# Patient Record
Sex: FEMALE | Race: BLACK OR AFRICAN AMERICAN | NOT HISPANIC OR LATINO | Employment: OTHER | URBAN - METROPOLITAN AREA
[De-identification: names, ages, dates, MRNs, and addresses within clinical notes are randomized per-mention and may not be internally consistent; named-entity substitution may affect disease eponyms.]

---

## 2017-03-11 ENCOUNTER — HOSPITAL ENCOUNTER (EMERGENCY)
Facility: HOSPITAL | Age: 52
Discharge: HOME | End: 2017-03-12
Attending: EMERGENCY MEDICINE
Payer: MEDICARE

## 2017-03-11 DIAGNOSIS — R10.9 ABDOMINAL PAIN, UNSPECIFIED LOCATION: ICD-10-CM

## 2017-03-11 DIAGNOSIS — K59.00 CONSTIPATION, UNSPECIFIED CONSTIPATION TYPE: Primary | ICD-10-CM

## 2017-03-11 LAB
ALBUMIN SERPL BCP-MCNC: 3.6 G/DL
ALP SERPL-CCNC: 56 U/L
ALT SERPL W/O P-5'-P-CCNC: 12 U/L
ANION GAP SERPL CALC-SCNC: 11 MMOL/L
AST SERPL-CCNC: 14 U/L
BASOPHILS # BLD AUTO: 0.04 K/UL
BASOPHILS NFR BLD: 0.4 %
BILIRUB SERPL-MCNC: 0.3 MG/DL
BUN SERPL-MCNC: 18 MG/DL
CALCIUM SERPL-MCNC: 9.4 MG/DL
CHLORIDE SERPL-SCNC: 102 MMOL/L
CO2 SERPL-SCNC: 31 MMOL/L
CREAT SERPL-MCNC: 0.9 MG/DL
DIFFERENTIAL METHOD: ABNORMAL
EOSINOPHIL # BLD AUTO: 0.1 K/UL
EOSINOPHIL NFR BLD: 1.1 %
ERYTHROCYTE [DISTWIDTH] IN BLOOD BY AUTOMATED COUNT: 12.3 %
EST. GFR  (AFRICAN AMERICAN): >60 ML/MIN/1.73 M^2
EST. GFR  (NON AFRICAN AMERICAN): >60 ML/MIN/1.73 M^2
GLUCOSE SERPL-MCNC: 93 MG/DL
HCT VFR BLD AUTO: 34.2 %
HGB BLD-MCNC: 11.3 G/DL
LYMPHOCYTES # BLD AUTO: 3.6 K/UL
LYMPHOCYTES NFR BLD: 39.9 %
MCH RBC QN AUTO: 31 PG
MCHC RBC AUTO-ENTMCNC: 33 %
MCV RBC AUTO: 94 FL
MONOCYTES # BLD AUTO: 0.6 K/UL
MONOCYTES NFR BLD: 6.7 %
NEUTROPHILS # BLD AUTO: 4.6 K/UL
NEUTROPHILS NFR BLD: 51.9 %
PLATELET # BLD AUTO: 300 K/UL
PMV BLD AUTO: 10.9 FL
POTASSIUM SERPL-SCNC: 3.7 MMOL/L
PROT SERPL-MCNC: 7.8 G/DL
RBC # BLD AUTO: 3.64 M/UL
SODIUM SERPL-SCNC: 144 MMOL/L
WBC # BLD AUTO: 8.93 K/UL

## 2017-03-11 PROCEDURE — 85025 COMPLETE CBC W/AUTO DIFF WBC: CPT

## 2017-03-11 PROCEDURE — 99284 EMERGENCY DEPT VISIT MOD MDM: CPT

## 2017-03-11 PROCEDURE — 25500020 PHARM REV CODE 255: Performed by: EMERGENCY MEDICINE

## 2017-03-11 PROCEDURE — 80053 COMPREHEN METABOLIC PANEL: CPT

## 2017-03-11 RX ORDER — DOCUSATE SODIUM 100 MG/1
100 CAPSULE, LIQUID FILLED ORAL 2 TIMES DAILY
Qty: 60 CAPSULE | Refills: 0 | Status: SHIPPED | OUTPATIENT
Start: 2017-03-11 | End: 2017-05-24 | Stop reason: SDUPTHER

## 2017-03-11 RX ORDER — GABAPENTIN 600 MG/1
600 TABLET ORAL 3 TIMES DAILY
COMMUNITY

## 2017-03-11 RX ORDER — HYDROCHLOROTHIAZIDE 25 MG/1
25 TABLET ORAL DAILY
COMMUNITY
End: 2017-05-24 | Stop reason: ALTCHOICE

## 2017-03-11 RX ORDER — BACLOFEN 20 MG/1
20 TABLET ORAL 3 TIMES DAILY
COMMUNITY

## 2017-03-11 RX ORDER — ZOLPIDEM TARTRATE 10 MG/1
10 TABLET ORAL NIGHTLY PRN
COMMUNITY
End: 2017-08-28 | Stop reason: SDUPTHER

## 2017-03-11 RX ORDER — SYRING-NEEDL,DISP,INSUL,0.3 ML 29 G X1/2"
296 SYRINGE, EMPTY DISPOSABLE MISCELLANEOUS ONCE
Qty: 296 ML | Refills: 0 | Status: SHIPPED | OUTPATIENT
Start: 2017-03-11 | End: 2017-03-11

## 2017-03-11 RX ADMIN — IOHEXOL 75 ML: 350 INJECTION, SOLUTION INTRAVENOUS at 11:03

## 2017-03-11 NOTE — ED AVS SNAPSHOT
[facility] -   [id number] [facility] [id number]               Josey Levine   3/11/2017  9:12 PM   ED    Description:  Female : 1965   Department:  [facility]           Your Care was Coordinated By:     Provider Role From To    Arcelia Ordoñez MD Attending Provider [phone] --      Reason for Visit     Abdominal Pain           Diagnoses this Visit        Comments    Constipation, unspecified constipation type    -  Primary       ED Disposition     None           To Do List           Follow-up Information     Follow up with [facility]. Schedule an appointment as soon as possible for a visit in 2 days.    Specialty:  Gastroenterology    Why:  Return to the Emergency Room    Contact information:    [address] [place] [id number]  [phone]       These Medications        Disp Refills Start End    magnesium citrate solution 296 mL 0 3/11/2017 3/11/2017    Take 296 mLs by mouth once. - Oral    docusate sodium (COLACE) 100 MG capsule 60 capsule 0 3/11/2017     Take 1 capsule (100 mg total) by mouth 2 (two) times daily. - Oral      [facility]     [facility] -  Assistance  Registered nurses in the [facility] provide clinical advisement, health education, appointment booking, and other advisory services.  Call for this free service at [phone].             Medications           Message regarding Medications     Verify the changes and/or additions to your medication regime listed below are the same as discussed with your clinician today.  If any of these changes or additions are incorrect, please notify your healthcare provider.        START taking these NEW medications        Refills    magnesium citrate solution 0    Sig: Take 296 mLs by mouth once.    Class: Print    Route: Oral    docusate sodium (COLACE) 100 MG capsule 0    Sig: Take 1 capsule  "(100 mg total) by mouth 2 (two) times daily.    Class: Print    Route: Oral      These medications were administered today        Dose Freq    omnipaque 350 iohexol 75 mL 75 mL IMG once as needed    Sig: Inject 75 mLs into the vein ONCE PRN for contrast.    Class: Normal    Route: Intravenous           Verify that the below list of medications is an accurate representation of the medications you are currently taking.  If none reported, the list may be blank. If incorrect, please contact your healthcare provider. Carry this list with you in case of emergency.           Current Medications     baclofen (LIORESAL) 20 MG tablet Take 20 mg by mouth 3 (three) times daily.    DOCUSATE CALCIUM (STOOL SOFTENER ORAL) Take by mouth.    gabapentin (NEURONTIN) 600 MG tablet Take 600 mg by mouth 3 (three) times daily.    hydrochlorothiazide (HYDRODIURIL) 25 MG tablet Take 25 mg by mouth once daily.    zolpidem (AMBIEN) 10 mg Tab Take 5 mg by mouth nightly as needed.    docusate sodium (COLACE) 100 MG capsule Take 1 capsule (100 mg total) by mouth 2 (two) times daily.    magnesium citrate solution Take 296 mLs by mouth once.           Clinical Reference Information           Your Vitals Were     BP Pulse Temp Resp Height Weight    109/75 (BP Location: Right arm, Patient Position: Sitting) 89 98.2 °F (36.8 °C) (Oral) 16 5' 3" (1.6 m) 64 kg (141 lb)    SpO2 BMI             95% 24.98 kg/m2         Allergies as of 3/11/2017     No Known Allergies      Immunizations Administered on Date of Encounter - 3/11/2017     None      ED Micro, Lab, POCT     Start Ordered       Status Ordering Provider    03/11/17 2127 03/11/17 2126  CBC auto differential  STAT      Final result     03/11/17 2127 03/11/17 2126  Comprehensive metabolic panel  STAT      Final result       ED Imaging Orders     Start Ordered       Status Ordering Provider    03/11/17 2127 03/11/17 2126  CT Abdomen Pelvis With Contrast  1 time imaging      Final result     "   Discharge References/Attachments     CONSTIPATION (ADULT) (ENGLISH)    DIET, EATING A HIGH-FIBER  (ENGLISH)    CONSTIPATION, TREATING (ENGLISH)      [facility] Sign-Up     Activating your [facility] account is as easy as 1-2-3!     1) Visit [email or web address], select Sign Up Now, enter this activation code and your date of birth, then select Next.  [email or web address]  Expires: 4/25/2017 11:38 PM      2) Create a username and password to use when you visit [facility] in the future and select a security question in case you lose your password and select Next.    3) Enter your e-mail address and click Sign Up!    Additional Information  If you have questions, please e-mail [email or web address] or call [phone] to talk to our [facility] staff. Remember, [facility] is NOT to be used for urgent needs. For medical emergencies, dial 911.          [facility] complies with applicable Federal civil rights laws and does not discriminate on the basis of race, color, national origin, age, disability, or sex.        Language Assistance Services     ATTENTION: Language assistance services are available, free of charge. Please call [phone].      ATENCIÓN: Si jeanmariela nikolasgabo, tiene a canales disposición servicios gratuitos de asistencia lingüística. Llame al [phone].     CHÚ Ý: N?u b?n nói Ti?ng Vi?t, có các d?ch v? h? tr? ngôn ng? mi?n phí dành cho b?n. G?i s? [phone].

## 2017-03-12 VITALS
RESPIRATION RATE: 16 BRPM | OXYGEN SATURATION: 97 % | HEART RATE: 79 BPM | WEIGHT: 141 LBS | SYSTOLIC BLOOD PRESSURE: 128 MMHG | DIASTOLIC BLOOD PRESSURE: 78 MMHG | TEMPERATURE: 98 F | HEIGHT: 63 IN | BODY MASS INDEX: 24.98 KG/M2

## 2017-03-12 NOTE — ED PROVIDER NOTES
SCRIBE #1 NOTE: I, Hill Paredes, am scribing for, and in the presence of, Arcelia Ordoñez MD. I have scribed the entire note.      History      Chief Complaint   Patient presents with    Abdominal Pain     left sided abd pain x 3 weeks. denies n/v/d.       Review of patient's allergies indicates:  No Known Allergies     HPI   HPI    3/11/2017, 9:20 PM   History obtained from the patient      History of Present Illness: Josey Levine is a 51 y.o. female patient who presents to the Emergency Department for LLQ abd pain which onset gradually 2-3 weeks ago worsening today. Symptoms are intermittent and moderate in severity. Sx are exacerbated by nothing and relieved by nothing. Pt states she was sent from urgent care for further evaluation. No other sxs reported. Patient denies any fever, N/V/D, chills, constipation, urinary frequency/urgency, difficulty urinating, dysuria, radiating pain, hematochezia and all other sxs at this time. No further complaints or concerns at this time.     Arrival mode: Personal vehicle     PCP: Unknown      Past Medical History:  Past Medical History:   Diagnosis Date    H/O spinal cord injury     Hypertension     Neck fracture        Past Surgical History:  Past Surgical History:   Procedure Laterality Date    CHOLECYSTECTOMY      HYSTERECTOMY      neck fracture      quadrepelgia      spinal cord injury      SPLENECTOMY, TOTAL           Family History:  History reviewed. No pertinent family history.    Social History:  Social History     Social History Main Topics    Smoking status: Current Every Day Smoker     Types: Cigarettes    Smokeless tobacco: Unknown    Alcohol use Yes    Drug use: No    Sexual activity: Unknown       ROS   Review of Systems   Constitutional: Negative for chills and fever.   HENT: Negative for congestion and sore throat.    Respiratory: Negative for chest tightness and shortness of breath.    Cardiovascular: Negative for chest pain.  "  Gastrointestinal: Positive for abdominal pain. Negative for constipation, diarrhea, nausea and vomiting.   Genitourinary: Negative for dysuria.   Musculoskeletal: Negative for back pain and neck pain.   Skin: Negative for rash.   Neurological: Negative for dizziness, weakness, numbness and headaches.   Hematological: Does not bruise/bleed easily.   Psychiatric/Behavioral: Negative for agitation and confusion.   All other systems reviewed and are negative.      Physical Exam    Initial Vitals   BP Pulse Resp Temp SpO2   03/11/17 2103 03/11/17 2103 03/11/17 2103 03/11/17 2103 03/11/17 2103   109/75 89 16 98.2 °F (36.8 °C) 95 %      Physical Exam  Nursing Notes and Vital Signs Reviewed.  Constitutional: Patient is in no acute distress. Awake and alert. Well-developed and well-nourished.  Head: Atraumatic. Normocephalic.  Eyes: PERRL. EOM intact. Conjunctivae are not pale. No scleal icterus.  ENT: Mucous membranes are moist. Oropharynx is clear and symmetric.    Neck: Supple. Full ROM. No lymphadenopathy.  Cardiovascular: Regular rate. Regular rhythm. No murmurs, rubs, or gallops. Distal pulses are 2+ and symmetric.  Pulmonary/Chest: No respiratory distress. Clear to auscultation bilaterally. No wheezing, rales, or rhonchi.  Abdominal: Soft and non-distended. Subjective TTP to the LLQ. No rebound, guarding, or rigidity. No palpable masses or hernias appreciated. Good bowel sounds.   Musculoskeletal: Moves all extremities. No obvious deformities. No edema. No calf tenderness.  Skin: Warm and dry.  Neurological:  Alert, awake, and appropriate.  Normal speech.  No acute focal neurological deficits are appreciated.  Psychiatric: Normal affect. Good eye contact. Appropriate in content.    ED Course    Procedures  ED Vital Signs:  Vitals:    03/11/17 2103 03/12/17 0023   BP: 109/75 128/78   Pulse: 89 79   Resp: 16    Temp: 98.2 °F (36.8 °C)    TempSrc: Oral    SpO2: 95% 97%   Weight: 64 kg (141 lb)    Height: 5' 3" (1.6 m)  "       Abnormal Lab Results:  Labs Reviewed   CBC W/ AUTO DIFFERENTIAL - Abnormal; Notable for the following:        Result Value    RBC 3.64 (*)     Hemoglobin 11.3 (*)     Hematocrit 34.2 (*)     All other components within normal limits   COMPREHENSIVE METABOLIC PANEL - Abnormal; Notable for the following:     CO2 31 (*)     All other components within normal limits        All Lab Results:  Results for orders placed or performed during the hospital encounter of 03/11/17   CBC auto differential   Result Value Ref Range    WBC 8.93 3.90 - 12.70 K/uL    RBC 3.64 (L) 4.00 - 5.40 M/uL    Hemoglobin 11.3 (L) 12.0 - 16.0 g/dL    Hematocrit 34.2 (L) 37.0 - 48.5 %    MCV 94 82 - 98 fL    MCH 31.0 27.0 - 31.0 pg    MCHC 33.0 32.0 - 36.0 %    RDW 12.3 11.5 - 14.5 %    Platelets 300 150 - 350 K/uL    MPV 10.9 9.2 - 12.9 fL    Gran # 4.6 1.8 - 7.7 K/uL    Lymph # 3.6 1.0 - 4.8 K/uL    Mono # 0.6 0.3 - 1.0 K/uL    Eos # 0.1 0.0 - 0.5 K/uL    Baso # 0.04 0.00 - 0.20 K/uL    Gran% 51.9 38.0 - 73.0 %    Lymph% 39.9 18.0 - 48.0 %    Mono% 6.7 4.0 - 15.0 %    Eosinophil% 1.1 0.0 - 8.0 %    Basophil% 0.4 0.0 - 1.9 %    Differential Method Automated    Comprehensive metabolic panel   Result Value Ref Range    Sodium 144 136 - 145 mmol/L    Potassium 3.7 3.5 - 5.1 mmol/L    Chloride 102 95 - 110 mmol/L    CO2 31 (H) 23 - 29 mmol/L    Glucose 93 70 - 110 mg/dL    BUN, Bld 18 6 - 20 mg/dL    Creatinine 0.9 0.5 - 1.4 mg/dL    Calcium 9.4 8.7 - 10.5 mg/dL    Total Protein 7.8 6.0 - 8.4 g/dL    Albumin 3.6 3.5 - 5.2 g/dL    Total Bilirubin 0.3 0.1 - 1.0 mg/dL    Alkaline Phosphatase 56 55 - 135 U/L    AST 14 10 - 40 U/L    ALT 12 10 - 44 U/L    Anion Gap 11 8 - 16 mmol/L    eGFR if African American >60 >60 mL/min/1.73 m^2    eGFR if non African American >60 >60 mL/min/1.73 m^2         Imaging Results:  Imaging Results         CT Abdomen Pelvis With Contrast (Final result) Result time:  03/11/17 23:33:36    Final result by Augustus Corado MD  (03/11/17 23:33:36)    Impression:        1.  There is no CT evidence of acute intra-abdominal inflammatory process.  Left lower quadrant is unremarkable other than moderate to large stool distending the rectosigmoid colon.  Constipation?  Fecal impaction?  No bowel obstruction.  2.  Cholecystectomy.  Hysterectomy.      All CT scans at this facility use dose modulation, iterative reconstruction, and/or weight based dosing when appropriate to reduce radiation dose to as low as reasonably achievable.      Electronically signed by: SINA RODRIGUEZ  Date:     03/11/17  Time:    23:33     Narrative:    Exam: CT ABDOMEN PELVIS WITH CONTRAST     Indication: Left lower quadrant abdominal pain x2 weeks.    Technique: Abdominal and pelvic CT performed with intravenous contrast with imaging during full the venous phase following 75 cc Omnipaque 350.    Comparison Study: None    Findings:     Abdominal CT.  Minimal dependent pleural thickening.  Cholecystectomy.  No bowel dilatation.  Liver, spleen, pancreas, adrenal glands, and kidneys are normal other than a tiny cortical cyst upper pole left kidney and a tiny peripheral calcification normal right kidney.  Normal aorta.  No adenopathy.    No bowel obstruction.  Normal appendix.  Moderate large amount of stool distends the rectosigmoid colon.  Constipation?  The GI tract is otherwise unremarkable.  Bones are unremarkable.    Pelvic CT.  Pelvic ring is intact.  No additional abnormality of the pelvic bowel loops.  Uterus is absent.  Normal ovaries.  No pelvic mass, free fluid, or lymphadenopathy.  Ureters and urinary bladder are normal.                      The Emergency Provider reviewed the vital signs and test results, which are outlined above.    ED Discussion     11:40 PM: Reassessed pt at this time.  Pt states her condition has improved at this time. Pt is laying comfortably in ED bed and in NAD. Pt is awake, alert, and oriented. Discussed with pt all pertinent ED information  and results. Discussed pt dx and plan of tx. Gave pt all f/u and return to the ED instructions. All questions and concerns were addressed at this time. Pt expresses understanding of information and instructions, and is comfortable with plan to discharge. Pt is stable for discharge.    I discussed with patient and/or family/caretaker that evaluation in the ED does not suggest any emergent or life threatening medical conditions requiring immediate intervention beyond what was provided in the ED, and I believe patient is safe for discharge.  Regardless, an unremarkable evaluation in the ED does not preclude the development or presence of a serious of life threatening condition. As such, patient was instructed to return immediately for any worsening or change in current symptoms.      ED Medication(s):  Medications   omnipaque 350 iohexol 75 mL (75 mLs Intravenous Given 3/11/17 9180)       Discharge Medication List as of 3/11/2017 11:38 PM      START taking these medications    Details   !! docusate sodium (COLACE) 100 MG capsule Take 1 capsule (100 mg total) by mouth 2 (two) times daily., Starting 3/11/2017, Until Discontinued, Print      magnesium citrate solution Take 296 mLs by mouth once., Starting 3/11/2017, Print       !! - Potential duplicate medications found. Please discuss with provider.          Follow-up Information     Follow up with [facility]. Schedule an appointment as soon as possible for a visit in 2 days.    Specialty:  Gastroenterology    Why:  Return to the Emergency Room    Contact information:    [address] [facility] [address] [phone]            Medical Decision Making    Medical Decision Making:   Clinical Tests:   Lab Tests: Reviewed and Ordered  Radiological Study: Ordered and Reviewed           Scribe Attestation:   Scribe #1: I performed the above scribed service and the documentation accurately describes the services I performed. I attest to the  accuracy of the note.    Attending:   Physician Attestation Statement for Scribe #1: I, Arcelia Ordoñez MD, personally performed the services described in this documentation, as scribed by Hill Paredes, in my presence, and it is both accurate and complete.          Clinical Impression       ICD-10-CM ICD-9-CM   1. Constipation, unspecified constipation type K59.00 564.00   2. Abdominal pain, unspecified location R10.9 789.00       Disposition:   Disposition: Discharged  Condition: Stable         Arcelia Ordoñez MD  03/12/17 0344

## 2017-05-24 ENCOUNTER — OFFICE VISIT (OUTPATIENT)
Dept: INTERNAL MEDICINE | Facility: CLINIC | Age: 52
End: 2017-05-24
Payer: MEDICARE

## 2017-05-24 VITALS
TEMPERATURE: 98 F | RESPIRATION RATE: 16 BRPM | OXYGEN SATURATION: 97 % | BODY MASS INDEX: 24.22 KG/M2 | WEIGHT: 136.69 LBS | HEART RATE: 73 BPM | SYSTOLIC BLOOD PRESSURE: 100 MMHG | DIASTOLIC BLOOD PRESSURE: 70 MMHG | HEIGHT: 63 IN

## 2017-05-24 DIAGNOSIS — R53.83 FATIGUE, UNSPECIFIED TYPE: Chronic | ICD-10-CM

## 2017-05-24 DIAGNOSIS — M54.2 CERVICALGIA: Primary | Chronic | ICD-10-CM

## 2017-05-24 DIAGNOSIS — N31.9 NEUROGENIC BLADDER: Chronic | ICD-10-CM

## 2017-05-24 DIAGNOSIS — S14.103S: Chronic | ICD-10-CM

## 2017-05-24 DIAGNOSIS — I95.2 HYPOTENSION DUE TO DRUGS: Chronic | ICD-10-CM

## 2017-05-24 DIAGNOSIS — K59.09 CONSTIPATION, CHRONIC: Chronic | ICD-10-CM

## 2017-05-24 DIAGNOSIS — G47.01 INSOMNIA DUE TO MEDICAL CONDITION: Chronic | ICD-10-CM

## 2017-05-24 DIAGNOSIS — Z79.899 ENCOUNTER FOR LONG-TERM CURRENT USE OF HIGH RISK MEDICATION: Chronic | ICD-10-CM

## 2017-05-24 DIAGNOSIS — F43.12 POST-TRAUMATIC STRESS DISORDER, CHRONIC: Chronic | ICD-10-CM

## 2017-05-24 DIAGNOSIS — Z79.891 LONG TERM CURRENT USE OF OPIATE ANALGESIC: Chronic | ICD-10-CM

## 2017-05-24 PROCEDURE — 99999 PR PBB SHADOW E&M-EST. PATIENT-LVL III: CPT | Mod: PBBFAC,,, | Performed by: FAMILY MEDICINE

## 2017-05-24 PROCEDURE — 99204 OFFICE O/P NEW MOD 45 MIN: CPT | Mod: S$PBB,,, | Performed by: FAMILY MEDICINE

## 2017-05-24 PROCEDURE — 99213 OFFICE O/P EST LOW 20 MIN: CPT | Mod: PBBFAC,PO | Performed by: FAMILY MEDICINE

## 2017-05-24 RX ORDER — OXYCODONE AND ACETAMINOPHEN 10; 325 MG/1; MG/1
1 TABLET ORAL EVERY 6 HOURS PRN
Refills: 0 | COMMUNITY
Start: 2017-05-04 | End: 2017-05-24 | Stop reason: SDUPTHER

## 2017-05-24 RX ORDER — OXYCODONE AND ACETAMINOPHEN 10; 325 MG/1; MG/1
1 TABLET ORAL EVERY 6 HOURS PRN
Qty: 60 TABLET | Refills: 0 | Status: SHIPPED | OUTPATIENT
Start: 2017-05-24 | End: 2017-06-07 | Stop reason: SDUPTHER

## 2017-05-24 RX ORDER — IBUPROFEN 200 MG
200 TABLET ORAL
COMMUNITY
End: 2017-12-22

## 2017-05-24 RX ORDER — TAMSULOSIN HYDROCHLORIDE 0.4 MG/1
1 CAPSULE ORAL NIGHTLY
COMMUNITY
Start: 2017-04-20 | End: 2017-06-27

## 2017-05-24 RX ORDER — ALPRAZOLAM 1 MG/1
1 TABLET ORAL 2 TIMES DAILY PRN
COMMUNITY
Start: 2017-04-05 | End: 2017-06-27

## 2017-05-24 RX ORDER — POLYETHYLENE GLYCOL 3350 17 G/17G
17 POWDER, FOR SOLUTION ORAL DAILY
Qty: 510 G | Refills: 5 | Status: SHIPPED | OUTPATIENT
Start: 2017-05-24

## 2017-05-24 RX ORDER — MIDODRINE HYDROCHLORIDE 2.5 MG/1
1 TABLET ORAL 2 TIMES DAILY
COMMUNITY
Start: 2017-05-15 | End: 2017-08-28 | Stop reason: SDUPTHER
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
## 2017-05-24 NOTE — ASSESSMENT & PLAN NOTE
She is demonstrating no behaviors to suggest inappropriate use of prescribed medications. No excess sedation reported. She understands that combining prescribed pain medications with other sedating medications or alcohol should be avoided due to risk of potentially life-threatening over-sedation.

## 2017-05-24 NOTE — ASSESSMENT & PLAN NOTE
Based on the report of the patient and information available to me at present, this condition appears to be FAIRLY CONTROLLED, and this condition appears to be STABLE. She reports that she has been off her Xanax for more than 10 days because she feels it is ineffective and causes her to feel sleepy.

## 2017-05-24 NOTE — ASSESSMENT & PLAN NOTE
Based on the report of the patient and information available to me at present, this condition appears to be STABLE/COMPENSATED.

## 2017-05-24 NOTE — ASSESSMENT & PLAN NOTE
This problem is NEW TO ME. Based on the report of the patient and information available to me at present, this condition appears to be COMPLICATED and REQUIRES FURTHER WORK-UP. She has hypotension on multiple BP measurements in office today, which I believe is, at least in part, related to medications. However, she says that this is a stable state for her, and she reports no orthostatic symptoms. She does report fatigue, but she says this is a chronic state for her.

## 2017-05-24 NOTE — ASSESSMENT & PLAN NOTE
Based on the report of the patient and information available to me at present, this condition appears to be SUB-OPTIMALLY (OR EQUIVOCALLY) CONTROLLED, and this condition appears to be STABLE. She reports long-standing chronic constipation, EXACERBATED by opioid analgesics. We discussed dietary strategies to help mitigate this problem.

## 2017-05-24 NOTE — ASSESSMENT & PLAN NOTE
Based on the report of the patient and information available to me at present, this condition appears to be FAIRLY CONTROLLED, and this condition appears to be STABLE.

## 2017-05-24 NOTE — ASSESSMENT & PLAN NOTE
She appears to be tolerating her current medications well and reports preceiving no adverse side-effects.

## 2017-05-25 ENCOUNTER — TELEPHONE (OUTPATIENT)
Dept: INTERNAL MEDICINE | Facility: CLINIC | Age: 52
End: 2017-05-25

## 2017-05-25 NOTE — TELEPHONE ENCOUNTER
Also faxed pt signed release to [facility] requesting last ER/Hosp D/C Summary with H&P and most recent lab results, imaging, testing, etc., fax transmission confirmed.

## 2017-05-25 NOTE — TELEPHONE ENCOUNTER
Per Dr. Dennis and pt request, faxed pt signed release to [facility] requesting last ER/Hosp D/C Summary with H&P and most recent lab results, imaging, testing, etc., fax transmission confirmed.

## 2017-05-25 NOTE — TELEPHONE ENCOUNTER
Also faxed pt signed release to [facility] requesting last office note with H&P and most recent lab results, imaging, testing, etc., fax transmission confirmed.

## 2017-05-29 ENCOUNTER — LAB VISIT (OUTPATIENT)
Dept: LAB | Facility: HOSPITAL | Age: 52
End: 2017-05-29
Attending: FAMILY MEDICINE
Payer: MEDICARE

## 2017-05-29 ENCOUNTER — PATIENT OUTREACH (OUTPATIENT)
Dept: ADMINISTRATIVE | Facility: HOSPITAL | Age: 52
End: 2017-05-29

## 2017-05-29 DIAGNOSIS — Z79.899 ENCOUNTER FOR LONG-TERM CURRENT USE OF HIGH RISK MEDICATION: Chronic | ICD-10-CM

## 2017-05-29 DIAGNOSIS — Z13.6 SCREENING FOR OTHER AND UNSPECIFIED CARDIOVASCULAR CONDITIONS: ICD-10-CM

## 2017-05-29 DIAGNOSIS — R53.83 FATIGUE, UNSPECIFIED TYPE: Chronic | ICD-10-CM

## 2017-05-29 DIAGNOSIS — I95.2 HYPOTENSION DUE TO DRUGS: Chronic | ICD-10-CM

## 2017-05-29 DIAGNOSIS — Z11.59 NEED FOR HEPATITIS C SCREENING TEST: ICD-10-CM

## 2017-05-29 DIAGNOSIS — Z11.59 NEED FOR HEPATITIS C SCREENING TEST: Primary | ICD-10-CM

## 2017-05-29 LAB
ALBUMIN SERPL BCP-MCNC: 3.3 G/DL
ALP SERPL-CCNC: 58 U/L
ALT SERPL W/O P-5'-P-CCNC: 14 U/L
ANION GAP SERPL CALC-SCNC: 9 MMOL/L
AST SERPL-CCNC: 13 U/L
BASOPHILS # BLD AUTO: 0.04 K/UL
BASOPHILS NFR BLD: 0.4 %
BILIRUB SERPL-MCNC: 0.7 MG/DL
BUN SERPL-MCNC: 18 MG/DL
CALCIUM SERPL-MCNC: 9.1 MG/DL
CHLORIDE SERPL-SCNC: 107 MMOL/L
CO2 SERPL-SCNC: 26 MMOL/L
CREAT SERPL-MCNC: 0.7 MG/DL
DIFFERENTIAL METHOD: ABNORMAL
EOSINOPHIL # BLD AUTO: 0.1 K/UL
EOSINOPHIL NFR BLD: 0.9 %
ERYTHROCYTE [DISTWIDTH] IN BLOOD BY AUTOMATED COUNT: 12.2 %
EST. GFR  (AFRICAN AMERICAN): >60 ML/MIN/1.73 M^2
EST. GFR  (NON AFRICAN AMERICAN): >60 ML/MIN/1.73 M^2
GLUCOSE SERPL-MCNC: 82 MG/DL
HCT VFR BLD AUTO: 34.7 %
HGB BLD-MCNC: 11.6 G/DL
LYMPHOCYTES # BLD AUTO: 3.3 K/UL
LYMPHOCYTES NFR BLD: 35.9 %
MCH RBC QN AUTO: 31.4 PG
MCHC RBC AUTO-ENTMCNC: 33.4 %
MCV RBC AUTO: 94 FL
MONOCYTES # BLD AUTO: 0.6 K/UL
MONOCYTES NFR BLD: 6.4 %
NEUTROPHILS # BLD AUTO: 5.1 K/UL
NEUTROPHILS NFR BLD: 56.2 %
PLATELET # BLD AUTO: 312 K/UL
PMV BLD AUTO: 10.5 FL
POTASSIUM SERPL-SCNC: 3.9 MMOL/L
PROT SERPL-MCNC: 7.3 G/DL
RBC # BLD AUTO: 3.69 M/UL
SODIUM SERPL-SCNC: 142 MMOL/L
WBC # BLD AUTO: 9.08 K/UL

## 2017-05-29 PROCEDURE — 80053 COMPREHEN METABOLIC PANEL: CPT

## 2017-05-29 PROCEDURE — 36415 COLL VENOUS BLD VENIPUNCTURE: CPT | Mod: PO

## 2017-05-29 PROCEDURE — 86803 HEPATITIS C AB TEST: CPT

## 2017-05-29 PROCEDURE — 85025 COMPLETE CBC W/AUTO DIFF WBC: CPT

## 2017-05-30 LAB — HCV AB SERPL QL IA: NEGATIVE

## 2017-06-07 ENCOUNTER — OFFICE VISIT (OUTPATIENT)
Dept: INTERNAL MEDICINE | Facility: CLINIC | Age: 52
End: 2017-06-07
Payer: MEDICARE

## 2017-06-07 VITALS
BODY MASS INDEX: 24.92 KG/M2 | HEIGHT: 63 IN | SYSTOLIC BLOOD PRESSURE: 98 MMHG | WEIGHT: 140.63 LBS | OXYGEN SATURATION: 97 % | TEMPERATURE: 96 F | HEART RATE: 71 BPM | DIASTOLIC BLOOD PRESSURE: 64 MMHG

## 2017-06-07 DIAGNOSIS — Z79.899 ENCOUNTER FOR LONG-TERM CURRENT USE OF HIGH RISK MEDICATION: Chronic | ICD-10-CM

## 2017-06-07 DIAGNOSIS — S14.103S: Chronic | ICD-10-CM

## 2017-06-07 DIAGNOSIS — M54.2 CERVICALGIA: Primary | Chronic | ICD-10-CM

## 2017-06-07 DIAGNOSIS — K59.09 CONSTIPATION, CHRONIC: Chronic | ICD-10-CM

## 2017-06-07 DIAGNOSIS — R53.83 FATIGUE, UNSPECIFIED TYPE: Chronic | ICD-10-CM

## 2017-06-07 DIAGNOSIS — N31.9 NEUROGENIC BLADDER: Chronic | ICD-10-CM

## 2017-06-07 DIAGNOSIS — I95.2 HYPOTENSION DUE TO DRUGS: Chronic | ICD-10-CM

## 2017-06-07 DIAGNOSIS — F43.12 POST-TRAUMATIC STRESS DISORDER, CHRONIC: Chronic | ICD-10-CM

## 2017-06-07 DIAGNOSIS — Z79.891 LONG TERM CURRENT USE OF OPIATE ANALGESIC: Chronic | ICD-10-CM

## 2017-06-07 PROCEDURE — 99999 PR PBB SHADOW E&M-EST. PATIENT-LVL III: CPT | Mod: PBBFAC,,, | Performed by: FAMILY MEDICINE

## 2017-06-07 PROCEDURE — 99214 OFFICE O/P EST MOD 30 MIN: CPT | Mod: S$PBB,,, | Performed by: FAMILY MEDICINE

## 2017-06-07 PROCEDURE — 99213 OFFICE O/P EST LOW 20 MIN: CPT | Mod: PBBFAC,PO | Performed by: FAMILY MEDICINE

## 2017-06-07 RX ORDER — OXYCODONE AND ACETAMINOPHEN 10; 325 MG/1; MG/1
1 TABLET ORAL EVERY 6 HOURS PRN
Qty: 60 TABLET | Refills: 0 | Status: SHIPPED | OUTPATIENT
Start: 2017-06-07 | End: 2017-06-27 | Stop reason: SDUPTHER
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
## 2017-06-08 ENCOUNTER — PATIENT OUTREACH (OUTPATIENT)
Dept: ADMINISTRATIVE | Facility: HOSPITAL | Age: 52
End: 2017-06-08

## 2017-06-16 ENCOUNTER — INITIAL CONSULT (OUTPATIENT)
Dept: PHYSICAL MEDICINE AND REHAB | Facility: CLINIC | Age: 52
End: 2017-06-16
Payer: MEDICARE

## 2017-06-16 VITALS
DIASTOLIC BLOOD PRESSURE: 64 MMHG | HEART RATE: 81 BPM | WEIGHT: 140 LBS | BODY MASS INDEX: 24.8 KG/M2 | HEIGHT: 63 IN | RESPIRATION RATE: 14 BRPM | SYSTOLIC BLOOD PRESSURE: 92 MMHG

## 2017-06-16 DIAGNOSIS — S14.103S: Primary | Chronic | ICD-10-CM

## 2017-06-16 DIAGNOSIS — R25.2 SPASTICITY: ICD-10-CM

## 2017-06-16 PROCEDURE — 99213 OFFICE O/P EST LOW 20 MIN: CPT | Mod: PBBFAC,PO | Performed by: PHYSICAL MEDICINE & REHABILITATION

## 2017-06-16 PROCEDURE — 99204 OFFICE O/P NEW MOD 45 MIN: CPT | Mod: S$PBB,,, | Performed by: PHYSICAL MEDICINE & REHABILITATION

## 2017-06-16 PROCEDURE — 99999 PR PBB SHADOW E&M-EST. PATIENT-LVL III: CPT | Mod: PBBFAC,,, | Performed by: PHYSICAL MEDICINE & REHABILITATION

## 2017-06-16 NOTE — LETTER
June 16, 2017      JAIRO Dennis MD  [address] [facility] [address]           [facility]  [address] [facility] [address]  Phone: [phone]  Fax: [phone]          Patient: Josey Levine   MR Number: [id number]   YOB: 1965   Date of Visit: 6/16/2017       Dear Dr. JAIRO Dennis:    Thank you for referring Josey Levine to me for evaluation. Attached you will find relevant portions of my assessment and plan of care.    If you have questions, please do not hesitate to call me. I look forward to following Josey Levine along with you.    Sincerely,    Sulema Crowley MD    Enclosure  CC:  No Recipients    If you would like to receive this communication electronically, please contact [email or web address] or [phone] to request more information on [employer] access.    For providers and/or their staff who would like to refer a patient to [facility], please contact us through our one-stop-shop provider referral line, [facility], at [phone].    If you feel you have received this communication in error or would no longer like to receive these types of communications, please e-mail [email or web address]
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
## 2017-06-19 ENCOUNTER — PATIENT MESSAGE (OUTPATIENT)
Dept: INTERNAL MEDICINE | Facility: CLINIC | Age: 52
End: 2017-06-19

## 2017-06-19 ENCOUNTER — TELEPHONE (OUTPATIENT)
Dept: INTERNAL MEDICINE | Facility: CLINIC | Age: 52
End: 2017-06-19

## 2017-06-19 NOTE — TELEPHONE ENCOUNTER
----- Message from JAIRO Dennis MD sent at 6/17/2017  8:58 AM CDT -----      ----- Message -----  From: Sulema Crowley MD  Sent: 6/14/2017   9:02 AM  To: JAIRO Dennis MD    This patient needs to be referred to Dr Samuel for pain management.  If rehab needs (w/c, PT) then I'll be happy to see her.
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
## 2017-06-27 ENCOUNTER — OFFICE VISIT (OUTPATIENT)
Dept: INTERNAL MEDICINE | Facility: CLINIC | Age: 52
End: 2017-06-27
Payer: MEDICARE

## 2017-06-27 VITALS
WEIGHT: 138.25 LBS | OXYGEN SATURATION: 97 % | DIASTOLIC BLOOD PRESSURE: 62 MMHG | HEART RATE: 84 BPM | BODY MASS INDEX: 24.5 KG/M2 | RESPIRATION RATE: 16 BRPM | TEMPERATURE: 98 F | HEIGHT: 63 IN | SYSTOLIC BLOOD PRESSURE: 102 MMHG

## 2017-06-27 DIAGNOSIS — M54.2 CERVICALGIA: Primary | Chronic | ICD-10-CM

## 2017-06-27 DIAGNOSIS — S14.103S: Chronic | ICD-10-CM

## 2017-06-27 DIAGNOSIS — F17.210 NICOTINE DEPENDENCE, CIGARETTES, UNCOMPLICATED: Chronic | ICD-10-CM

## 2017-06-27 DIAGNOSIS — R25.2 SPASTICITY: ICD-10-CM

## 2017-06-27 DIAGNOSIS — I95.2 HYPOTENSION DUE TO DRUGS: Chronic | ICD-10-CM

## 2017-06-27 DIAGNOSIS — N31.9 NEUROGENIC BLADDER: Chronic | ICD-10-CM

## 2017-06-27 DIAGNOSIS — Z79.891 LONG TERM CURRENT USE OF OPIATE ANALGESIC: Chronic | ICD-10-CM

## 2017-06-27 DIAGNOSIS — F43.12 POST-TRAUMATIC STRESS DISORDER, CHRONIC: Chronic | ICD-10-CM

## 2017-06-27 PROCEDURE — 99999 PR PBB SHADOW E&M-EST. PATIENT-LVL V: CPT | Mod: PBBFAC,,, | Performed by: FAMILY MEDICINE

## 2017-06-27 PROCEDURE — 99214 OFFICE O/P EST MOD 30 MIN: CPT | Mod: S$PBB,,, | Performed by: FAMILY MEDICINE

## 2017-06-27 PROCEDURE — 99215 OFFICE O/P EST HI 40 MIN: CPT | Mod: PBBFAC,PO | Performed by: FAMILY MEDICINE

## 2017-06-27 RX ORDER — OXYCODONE AND ACETAMINOPHEN 10; 325 MG/1; MG/1
1 TABLET ORAL EVERY 6 HOURS PRN
Qty: 120 TABLET | Refills: 0 | Status: SHIPPED | OUTPATIENT
Start: 2017-06-27 | End: 2017-07-27

## 2017-06-27 NOTE — PATIENT INSTRUCTIONS
· Decrease your Gabapentin to twice daily (morning and evening) for 1 week.  · After that, then decrease your Gabapentin to once daily at bedtime.

## 2017-06-27 NOTE — ASSESSMENT & PLAN NOTE
She is currently at the preparation stage of smoking cessation (getting ready to quit). Smoking cessation encouraged.
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
## 2017-07-03 ENCOUNTER — PATIENT MESSAGE (OUTPATIENT)
Dept: INTERNAL MEDICINE | Facility: CLINIC | Age: 52
End: 2017-07-03

## 2017-07-03 ENCOUNTER — TELEPHONE (OUTPATIENT)
Dept: INTERNAL MEDICINE | Facility: CLINIC | Age: 52
End: 2017-07-03

## 2017-07-03 NOTE — TELEPHONE ENCOUNTER
Faxed Pain Management Referral to Dr. Akira Dong F#[phone] requesting they call pt to set pt up for appt, fax transmission confirmed.

## 2017-07-19 ENCOUNTER — CLINICAL SUPPORT (OUTPATIENT)
Dept: SMOKING CESSATION | Facility: CLINIC | Age: 52
End: 2017-07-19
Payer: COMMERCIAL

## 2017-07-19 DIAGNOSIS — F17.200 NICOTINE DEPENDENCE: Primary | ICD-10-CM

## 2017-07-19 PROCEDURE — 99404 PREV MED CNSL INDIV APPRX 60: CPT | Mod: S$GLB,,, | Performed by: GENERAL PRACTICE

## 2017-07-19 RX ORDER — PHENYTOIN 50 MG/1
50 TABLET, CHEWABLE ORAL 3 TIMES DAILY
COMMUNITY

## 2017-07-20 RX ORDER — MICONAZOLE NITRATE 2 %
2 CREAM (GRAM) TOPICAL
Qty: 110 EACH | Refills: 2 | Status: SHIPPED | OUTPATIENT
Start: 2017-07-20 | End: 2017-11-02

## 2017-07-26 ENCOUNTER — OFFICE VISIT (OUTPATIENT)
Dept: INTERNAL MEDICINE | Facility: CLINIC | Age: 52
End: 2017-07-26
Payer: MEDICARE

## 2017-07-26 VITALS
HEIGHT: 63 IN | TEMPERATURE: 96 F | OXYGEN SATURATION: 97 % | HEART RATE: 80 BPM | BODY MASS INDEX: 23.91 KG/M2 | DIASTOLIC BLOOD PRESSURE: 76 MMHG | WEIGHT: 134.94 LBS | SYSTOLIC BLOOD PRESSURE: 112 MMHG

## 2017-07-26 DIAGNOSIS — Z12.31 ENCOUNTER FOR SCREENING MAMMOGRAM FOR MALIGNANT NEOPLASM OF BREAST: ICD-10-CM

## 2017-07-26 DIAGNOSIS — Z79.891 LONG TERM CURRENT USE OF OPIATE ANALGESIC: Chronic | ICD-10-CM

## 2017-07-26 DIAGNOSIS — Z12.12 SCREENING FOR COLORECTAL CANCER: Chronic | ICD-10-CM

## 2017-07-26 DIAGNOSIS — R25.2 SPASTICITY: ICD-10-CM

## 2017-07-26 DIAGNOSIS — K59.09 CONSTIPATION, CHRONIC: Chronic | ICD-10-CM

## 2017-07-26 DIAGNOSIS — M54.2 CERVICALGIA: Chronic | ICD-10-CM

## 2017-07-26 DIAGNOSIS — Z12.39 BREAST CANCER SCREENING: ICD-10-CM

## 2017-07-26 DIAGNOSIS — F17.211 NICOTINE DEPENDENCE, CIGARETTES, IN REMISSION: Chronic | ICD-10-CM

## 2017-07-26 DIAGNOSIS — I95.2 HYPOTENSION DUE TO DRUGS: Chronic | ICD-10-CM

## 2017-07-26 DIAGNOSIS — Z12.11 SCREENING FOR COLORECTAL CANCER: Chronic | ICD-10-CM

## 2017-07-26 DIAGNOSIS — Z12.39 SCREENING FOR BREAST CANCER: ICD-10-CM

## 2017-07-26 DIAGNOSIS — S14.103S: Primary | Chronic | ICD-10-CM

## 2017-07-26 PROCEDURE — 99214 OFFICE O/P EST MOD 30 MIN: CPT | Mod: S$PBB,,, | Performed by: FAMILY MEDICINE

## 2017-07-26 PROCEDURE — 99213 OFFICE O/P EST LOW 20 MIN: CPT | Mod: PBBFAC,PO | Performed by: FAMILY MEDICINE

## 2017-07-26 PROCEDURE — 99999 PR PBB SHADOW E&M-EST. PATIENT-LVL III: CPT | Mod: PBBFAC,,, | Performed by: FAMILY MEDICINE

## 2017-07-26 RX ORDER — FLUOXETINE 10 MG/1
1 CAPSULE ORAL DAILY
COMMUNITY
Start: 2017-07-13 | End: 2017-12-05

## 2017-07-26 RX ORDER — SODIUM, POTASSIUM,MAG SULFATES 17.5-3.13G
SOLUTION, RECONSTITUTED, ORAL ORAL
Qty: 354 ML | Refills: 0 | Status: SHIPPED | OUTPATIENT
Start: 2017-07-26 | End: 2017-08-28

## 2017-07-26 RX ORDER — OLANZAPINE 5 MG/1
1 TABLET ORAL NIGHTLY
COMMUNITY
Start: 2017-07-13 | End: 2017-12-05

## 2017-07-26 RX ORDER — DOXEPIN HYDROCHLORIDE 50 MG/1
1 CAPSULE ORAL NIGHTLY
COMMUNITY
Start: 2017-07-13 | End: 2017-12-05

## 2017-07-26 NOTE — ASSESSMENT & PLAN NOTE
She is currently at the action (quitting) stage of smoking cessation. Smoking cessation encouraged.

## 2017-07-26 NOTE — PROGRESS NOTES
  HISTORY OF PRESENT ILLNESS    PROBLEM/CONDITION: She reports constipation. SEVERITY described as MODERATE. EXACERBATING FACTORS include medication side effects. ONSET was gradual over the last several months. She has never had screening colonoscopy.    PROBLEM/CONDITION: Incomplete C3 cervical spine injury with ASSOCIATED spasticity, unchanged. I referred her to physical medicine and rehabilitation specialist.    PROBLEM/CONDITION: She reports no orthostatic symptoms, syncope or near syncope, but her blood pressure is noted below, and we discussed how this may be adverse effect of medication. However, she is not inclined to consider a trial of winning or discontinuation any of her medications.    No other complaints or concerns reported.      REVIEW OF SYSTEMS    CONSTITUTIONAL: No fever reported.  CARDIOVASCULAR: No chest pain reported.  PULMONARY: No trouble breathing reported.      PHYSICAL EXAM    CONSTITUTIONAL: Vital signs (BP, P, T, RR, et al) noted. No apparent distress. Does not appear acutely ill or septic. Appears adequately hydrated.  HEAD: Normocephalic.  ENT: Oropharynx moist.  PULM: Lungs clear. Breathing unlabored.  CV: Heart regular.  DERM: Warm and moist.  PSYCHIATRIC: Alert and oriented x 3. Mood is grossly neutral. Affect appropriate. Judgment and insight not grossly compromised.    CHIEF COMPLAINT  Follow-up  Re-evaluate the status of multiple medical problems.     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         Current Assessment & Plan     She is currently at the action (quitting) stage of smoking cessation. Smoking cessation encouraged.          Screening for colorectal cancer (Chronic)    Relevant Medications    sodium,potassium,mag sulfates (SUPREP BOWEL PREP KIT) 17.5-3.13-1.6 gram SolR    Other Relevant Orders    Case request GI: COLONOSCOPY (Completed)      Other Visit Diagnoses     Encounter for screening mammogram for malignant neoplasm of breast         Relevant Orders    Mammo Digital Screening Bilat with CAD    Breast cancer screening        Relevant Orders    Mammo Digital Screening Bilat with CAD          VITAL SIGNS  Vitals:    07/26/17 1521   BP: 112/76   BP Location: Right arm   Patient Position: Sitting   Pulse: 80   Temp: 96.4 °F (35.8 °C)   TempSrc: Tympanic   SpO2: 97%   Weight: 61.2 kg (134 lb 14.7 oz)   Height: 5' 3" (1.6 m)       PAST MEDICAL HISTORY, FAMILY HISTORY, SOCIAL HISTORY, CURRENT MEDICATION LIST, and ALLERGY LIST reviewed by me (JAIRO Dennis MD) and are updated consistent with the patient's report.    ASSESSMENT and PLAN  Unspecified injury at c3 level of cervical spinal cord, sequela    Cervicalgia    Nicotine dependence, cigarettes, in remission    Spasticity    Long term current use of opiate analgesic    Hypotension due to drugs    Constipation, chronic  -     Case request GI: COLONOSCOPY  -     sodium,potassium,mag sulfates (SUPREP BOWEL PREP KIT) 17.5-3.13-1.6 gram SolR; Use as directed  Dispense: 354 mL; " Refill: 0    Screening for colorectal cancer  -     Case request GI: COLONOSCOPY  -     sodium,potassium,mag sulfates (SUPREP BOWEL PREP KIT) 17.5-3.13-1.6 gram SolR; Use as directed  Dispense: 354 mL; Refill: 0    Screening for breast cancer    Encounter for screening mammogram for malignant neoplasm of breast   -     Mammo Digital Screening Bilat with CAD; Future; Expected date: 07/26/2017    Breast cancer screening  -     Mammo Digital Screening Bilat with CAD; Future; Expected date: 07/26/2017        Medication List with Changes/Refills   New Medications    SODIUM,POTASSIUM,MAG SULFATES (SUPREP BOWEL PREP KIT) 17.5-3.13-1.6 GRAM SOLR    Use as directed   Current Medications    BACLOFEN (LIORESAL) 20 MG TABLET    Take 20 mg by mouth 3 (three) times daily.    DOCUSATE CALCIUM (STOOL SOFTENER ORAL)    Take 1 capsule by mouth 2 (two) times daily.     DOXEPIN (SINEQUAN) 50 MG CAPSULE    Take 1 capsule by mouth nightly.    FLUOXETINE (PROZAC) 10 MG CAPSULE    Take 1 capsule by mouth once daily.    GABAPENTIN (NEURONTIN) 600 MG TABLET    Take 600 mg by mouth 3 (three) times daily.    IBUPROFEN (ADVIL,MOTRIN) 200 MG TABLET    Take 200 mg by mouth as needed for Pain.    MIDODRINE (PROAMATINE) 2.5 MG TAB    Take 1 tablet by mouth 2 (two) times daily.    NICOTINE, POLACRILEX, (NICORETTE) 2 MG GUM    Take 1 each (2 mg total) by mouth as needed (use no more than 5 pieces in 24 hours).    OLANZAPINE (ZYPREXA) 5 MG TABLET    Take 1 tablet by mouth nightly.    PHENYTOIN (DILANTIN) 50 MG CHEWABLE TABLET    Take 50 mg by mouth 3 (three) times daily.    POLYETHYLENE GLYCOL (GLYCOLAX) 17 GRAM/DOSE POWDER    Take 17 g by mouth once daily.    ZOLPIDEM (AMBIEN) 10 MG TAB    Take 10 mg by mouth nightly as needed.        Return in about 6 months (around 1/26/2018) for periodic management of chronic medical conditions.    ABOUT THIS DOCUMENTATION:  · The order of the conditions listed in the HPI is one of convenience and does not  necessarily reflect the chronology of the appointment, nor the relative importance of a condition. It is possible that additional description or status details about condition(s) may be found elsewhere in the documentation for today's encounter.  · Documentation entered by me for this encounter was done in part using speech-recognition technology. Although I have made an effort to ensure accuracy, malapropisms may exist and should be interpreted in context.                        -JAIRO Dennis MD    There are no Patient Instructions on file for this visit.

## 2017-08-08 ENCOUNTER — CLINICAL SUPPORT (OUTPATIENT)
Dept: SMOKING CESSATION | Facility: CLINIC | Age: 52
End: 2017-08-08
Payer: COMMERCIAL

## 2017-08-08 DIAGNOSIS — F17.200 NICOTINE DEPENDENCE: Primary | ICD-10-CM

## 2017-08-08 PROCEDURE — 99406 BEHAV CHNG SMOKING 3-10 MIN: CPT | Mod: S$GLB,,, | Performed by: GENERAL PRACTICE

## 2017-08-14 ENCOUNTER — TELEPHONE (OUTPATIENT)
Dept: INTERNAL MEDICINE | Facility: CLINIC | Age: 52
End: 2017-08-14

## 2017-08-14 NOTE — TELEPHONE ENCOUNTER
----- Message from Cleo Arshad sent at 8/14/2017  2:05 PM CDT -----  Contact: [facility]  Request a call concerning chart notes on the pt, can be reached at [phone]///thxMW

## 2017-08-22 ENCOUNTER — TELEPHONE (OUTPATIENT)
Dept: INTERNAL MEDICINE | Facility: CLINIC | Age: 52
End: 2017-08-22

## 2017-08-22 NOTE — TELEPHONE ENCOUNTER
----- Message from Josey Ramos sent at 8/22/2017  3:45 PM CDT -----  Patient states that she needs to be put back on her fluid pill because she is retaining too much fluid and she can hardly walk.   Call her at [phone].                             koenig

## 2017-08-24 ENCOUNTER — TELEPHONE (OUTPATIENT)
Dept: INTERNAL MEDICINE | Facility: CLINIC | Age: 52
End: 2017-08-24

## 2017-08-24 NOTE — TELEPHONE ENCOUNTER
----- Message from Kaur Martins sent at 8/24/2017  1:20 PM CDT -----  Contact: pt  She's calling stating that she would like to get back on the fluid pills that she was taking, she stated that it has been 2 months since she has taken them and she is in severe pain, please advise [phone]

## 2017-08-24 NOTE — TELEPHONE ENCOUNTER
"Josey Irwin.    I am responding to your message, which was sent to me as: "Patient states that she needs to be put back on her fluid pill because she is retaining too much fluid."    RESPONSE: Diuretics, or "fluid pills" can be caused problem with your kidneys and your potassium levels. If you are "retaining too much fluid" and then lets get together and clarify WHY you are retaining fluid, and then agree on a plan for the best way to treat this.    I do not feel comfortable just throwing pills at you without evaluating you properly. Please schedule an appointment at your convenience so that we can give this issue the time inattention that it deserves.    Thanks for letting me care for you.    Kind regards,    JAIRO Dennis M.D.    TIP: It is a good idea to take your current prescription bottles with you to your doctor visits so we can check for any refills you may need.    ----------------------------------------------------------------  TO SCHEDULE OR CHANGE AN APPOINTMENT  ----------------------------------------------------------------  *Login to your [facility] account at [email or web address]  *Use the [facility] sonam on your mobile device   or  *Call our appointment desk at [phone].    ----------------------------------------------------------------  ADDITIONAL IMPORTANT INFORMATION  ----------------------------------------------------------------  *[facility] is NOT to be used for urgent needs.  *Although we try to respond to messages within 2 business days, a response can take longer, depending on circumstances.  *For medical emergencies, dial 911.    You can get help with [facility] and [employer] by email at  [email or web address] or by phone at [phone]. The [facility] Patient Support Team is available Monday through Friday from 9 a.m. until 5 p.m.  (After hours, you can leave a message requesting assistance.)       "

## 2017-08-25 ENCOUNTER — TELEPHONE (OUTPATIENT)
Dept: INTERNAL MEDICINE | Facility: CLINIC | Age: 52
End: 2017-08-25

## 2017-08-25 NOTE — TELEPHONE ENCOUNTER
----- Message from Radha Mccarty sent at 8/25/2017 12:18 PM CDT -----  Contact: [phone]  Patient would like to consult with nurse regarding medication. Please call back at [phone].      Thanks,  Radha Mccarty

## 2017-08-25 NOTE — TELEPHONE ENCOUNTER
----- Message from Josey Ramos sent at 8/25/2017  9:18 AM CDT -----  Patient states that she went to [facility] to  her prescription, but they did not have one for her.  She wants to make sure that it was called in.   Call her at [phone].                                          koenig

## 2017-08-28 ENCOUNTER — OFFICE VISIT (OUTPATIENT)
Dept: INTERNAL MEDICINE | Facility: CLINIC | Age: 52
End: 2017-08-28
Payer: MEDICARE

## 2017-08-28 VITALS
OXYGEN SATURATION: 97 % | SYSTOLIC BLOOD PRESSURE: 112 MMHG | HEART RATE: 79 BPM | DIASTOLIC BLOOD PRESSURE: 78 MMHG | TEMPERATURE: 96 F | BODY MASS INDEX: 23.91 KG/M2 | HEIGHT: 63 IN | WEIGHT: 134.94 LBS

## 2017-08-28 DIAGNOSIS — Z79.891 LONG TERM CURRENT USE OF OPIATE ANALGESIC: Chronic | ICD-10-CM

## 2017-08-28 DIAGNOSIS — G47.01 INSOMNIA DUE TO MEDICAL CONDITION: Chronic | ICD-10-CM

## 2017-08-28 DIAGNOSIS — R25.2 SPASTICITY: ICD-10-CM

## 2017-08-28 DIAGNOSIS — K59.09 CONSTIPATION, CHRONIC: Primary | Chronic | ICD-10-CM

## 2017-08-28 DIAGNOSIS — I87.303 CHRONIC VENOUS HYPERTENSION INVOLVING BOTH SIDES: Chronic | ICD-10-CM

## 2017-08-28 DIAGNOSIS — I95.1 AUTONOMIC ORTHOSTATIC HYPOTENSION: Chronic | ICD-10-CM

## 2017-08-28 DIAGNOSIS — Z12.11 SCREENING FOR COLORECTAL CANCER: Chronic | ICD-10-CM

## 2017-08-28 DIAGNOSIS — Z12.12 SCREENING FOR COLORECTAL CANCER: Chronic | ICD-10-CM

## 2017-08-28 DIAGNOSIS — S14.103S: Chronic | ICD-10-CM

## 2017-08-28 PROCEDURE — 99215 OFFICE O/P EST HI 40 MIN: CPT | Mod: S$PBB,,, | Performed by: FAMILY MEDICINE

## 2017-08-28 PROCEDURE — 99215 OFFICE O/P EST HI 40 MIN: CPT | Mod: PBBFAC,PO | Performed by: FAMILY MEDICINE

## 2017-08-28 PROCEDURE — 99999 PR PBB SHADOW E&M-EST. PATIENT-LVL V: CPT | Mod: PBBFAC,,, | Performed by: FAMILY MEDICINE

## 2017-08-28 RX ORDER — TAPENTADOL HYDROCHLORIDE 150 MG/1
1 TABLET, FILM COATED, EXTENDED RELEASE ORAL NIGHTLY
Refills: 0 | COMMUNITY
Start: 2017-08-03

## 2017-08-28 RX ORDER — MIDODRINE HYDROCHLORIDE 2.5 MG/1
2.5 TABLET ORAL
Qty: 90 TABLET | Refills: 0 | Status: SHIPPED | OUTPATIENT
Start: 2017-08-28 | End: 2017-10-13 | Stop reason: SDUPTHER

## 2017-08-28 RX ORDER — TRAMADOL HYDROCHLORIDE 50 MG/1
1 TABLET ORAL 3 TIMES DAILY PRN
Refills: 0 | COMMUNITY
Start: 2017-08-09

## 2017-08-28 RX ORDER — OXYCODONE AND ACETAMINOPHEN 10; 325 MG/1; MG/1
1 TABLET ORAL 3 TIMES DAILY
Refills: 0 | COMMUNITY
Start: 2017-08-24

## 2017-08-28 RX ORDER — ZOLPIDEM TARTRATE 10 MG/1
10 TABLET ORAL NIGHTLY PRN
Qty: 30 TABLET | Refills: 0 | Status: SHIPPED | OUTPATIENT
Start: 2017-08-28 | End: 2017-12-05

## 2017-08-28 NOTE — PROGRESS NOTES
"CHIEF COMPLAINT  Edema      HISTORY OF PRESENT ILLNESS       PROBLEM/CONDITION: Autonomic orthostatic hypotension  STATUS/DESCRIPTION: Since her cervical spine injury, she has had orthostatic hypotension, which apparently is getting worse. She says that when her caregiver assists her out of bed into her wheelchair, going from a supine position to a sitting position, she "passes out" for several seconds. However, I observed her in the exam room, lying supine on the exam table and able to be transferred her to her wheelchair without syncope or near syncope. She is on low-dose midodrine 2.5 mg TWO TIMES DAILY for treatment of this. She says she has never seen a cardiologist for evaluation and treatment of this problem.  PLAN: We discussed risks and benefits of treatment options.  -     Ambulatory referral to Cardiology  -     Ambulatory referral to Neurology  -     midodrine (PROAMATINE) 2.5 MG Tab; Take 1 tablet (2.5 mg total) by mouth 3 (three) times daily with meals. -- refills to come from cardiologist  Dispense: 90 tablet; Refill: 0      PROBLEM/CONDITION: Chronic venous hypertension involving both sides  STATUS/DESCRIPTION: She describes chronic bilateral lower extremity dependent edema, and she is insisting that I reinitiate her "fluid pill," which she cannot name, but which I identified as hydrochlorothiazide 25 mg. When I began caring for her, it was observed that she was HYPOTENSIVE, and she had no significant edema on exam, so it was then agreed to DISCONTINUE the hydrochlorothiazide. She remains NORMOTENSIVE, and has no significant edema on exam today, and no compelling indication for hydrochlorothiazide that I can identify. I feel that her orthostatic hypotension represents a relative contraindication.  PLAN: We discussed risks and benefits of treatment options. She and I were not able to arrive at a mutually agreeable treatment plan, so I am referring her to cardiology for second opinion.   -     " Ambulatory referral to Cardiology      PROBLEM/CONDITION:  Constipation, chronic  STATUS/DESCRIPTION: Etiology is likely multifactorial, EXACERBATED by her chronic opioid use. She reports taking MiraLAX on a regular basis with unsatisfactory symptom relief. She reports that she has not tried or used enemas within the recent past.  PLAN: I provided her medication education information use of mineral oil enemas as needed.  -     Ambulatory referral to Gastroenterology      PROBLEM/CONDITION: Unspecified injury at c3 level of cervical spinal cord, sequela  STATUS/DESCRIPTION: She reports no new neurologic deficits. She reports no skin breakdown, although she reports that she will get some redness of her ischial areas if she stays in her wheelchair for prolonged periods. I explained that her orthostatic syncope/near syncope episodes are likely related, at least in part, to her cervical spine injury.  PLAN:  I emphasized importance of frequent repositioning to promote effective offloading.   -     Ambulatory referral to Cardiology  -     Ambulatory referral to Neurology      PROBLEM/CONDITION: Long term current use of opiate analgesic  STATUS/DESCRIPTION: She is seeing pain management specialist, Dr. Akira Obando. She is demonstrating no behaviors to suggest inappropriate use of prescribed medications. No excess sedation reported. She understands that combining prescribed pain medications with other sedating medications or alcohol should be avoided due to risk of potentially life-threatening over-sedation.  PLAN: Continue to follow with Dr. Obando.      PROBLEM/CONDITION: Spasticity  STATUS/DESCRIPTION: She does not appear to have significant spasticity on exam today, but she is requesting prescription for muscle relaxant.   PLAN:  Out of concern for possible drug-drug interaction and medication side effects, I recommended AGAINST adding such a medicine.  -     Ambulatory referral to  Neurology    PROBLEM/CONDITION: Insomnia due to medical condition  STATUS/DESCRIPTION: She reports insomnia refractory to behavioral interventions. She is currently seeing a psychiatrist for her posttraumatic stress disorder and related conditions.  PLAN: I educated her risk of excess sedation when combining Ambien with her other medications. I agreed to provide her a 1 month prescription for her current dose of Ambien, and she is to request all future refills from her psychiatrist, who is managing her other psychotropic medications.  -     zolpidem (AMBIEN) 10 mg Tab; Take 1 tablet (10 mg total) by mouth nightly as needed. -- Request refills from her psychiatrist.  Dispense: 30 tablet; Refill: 0      PROBLEM/CONDITION: Screening for colorectal cancer  STATUS/DESCRIPTION: I had referred her previously for colonoscopy, but for unclear reasons, she never had the procedure done. I have already provided her with prescription for the colonoscopy prep, which she says she has already obtained.  PLAN:    -     Ambulatory referral to Gastroenterology    Problem List Items Addressed This Visit     Spasticity    Relevant Orders    Ambulatory referral to Neurology    Constipation, chronic - Primary (Chronic)    Relevant Orders    Ambulatory referral to Gastroenterology    Unspecified injury at c3 level of cervical spinal cord, sequela (Chronic)    Relevant Orders    Ambulatory referral to Cardiology    Ambulatory referral to Neurology    Insomnia due to medical condition (Chronic)    Relevant Medications    zolpidem (AMBIEN) 10 mg Tab    Long term current use of opiate analgesic (Chronic)    Screening for colorectal cancer (Chronic)    Relevant Orders    Ambulatory referral to Gastroenterology    Autonomic orthostatic hypotension (Chronic)    Relevant Medications    midodrine (PROAMATINE) 2.5 MG Tab    Other Relevant Orders    Ambulatory referral to Cardiology    Ambulatory referral to Neurology    Chronic venous  "hypertension involving both sides (Chronic)    Relevant Orders    Ambulatory referral to Cardiology      Other Visit Diagnoses    None.         REVIEW OF SYSTEMS  PSYCHIATRIC: No suicidal ideations, justus or hypomania reported.  NEUROLOGIC: No seizures reported.  ENDOCRINE: No hematuria reported.     PHYSICAL EXAM  Vitals:    08/28/17 1437   BP: 112/78   BP Location: Right arm   Patient Position: Sitting   BP Method: Medium (Manual)   Pulse: 79   Temp: 96.2 °F (35.7 °C)   TempSrc: Tympanic   SpO2: 97%   Weight: 61.2 kg (134 lb 14.7 oz)   Height: 5' 3" (1.6 m)     CONSTITUTIONAL: Vital signs (BP, P, T, RR, et al) noted. No apparent distress. Does not appear acutely ill or septic. Appears adequately hydrated.  HEENT: External ENT grossly unremarkable. Hearing grossly intact. Oropharynx moist.  PULM: Lungs clear. Breathing unlabored.  HEART: Auscultation reveals regular rate and rhythm without murmur, gallop or rub. No carotid bruit. Trace non-pitting pedal edema.  DERM: Skin warm and moist with normal turgor.  NEURO: Incomplete C3 quadriplegia. No gross deficits of cranial nerves III-XII.  PSYCHIATRIC: Alert and oriented x 3. Mood is grossly neutral. Affect appropriate. Judgment and insight not grossly compromised.    PAST MEDICAL HISTORY, FAMILY HISTORY, SOCIAL HISTORY, CURRENT MEDICATION LIST, and ALLERGY LIST reviewed by me (JAIRO Dennis MD) and are updated consistent with the patient's report.    ASSESSMENT and PLAN  Constipation, chronic  -     Ambulatory referral to Gastroenterology    Unspecified injury at c3 level of cervical spinal cord, sequela  -     Ambulatory referral to Cardiology  -     Ambulatory referral to Neurology    Autonomic orthostatic hypotension  -     Ambulatory referral to Cardiology  -     Ambulatory referral to Neurology  -     midodrine (PROAMATINE) 2.5 MG Tab; Take 1 tablet (2.5 mg total) by mouth 3 (three) times daily with meals. -- refills to come from cardiologist  " Dispense: 90 tablet; Refill: 0    Chronic venous hypertension involving both sides  -     Ambulatory referral to Cardiology    Long term current use of opiate analgesic    Spasticity  -     Ambulatory referral to Neurology    Insomnia due to medical condition  -     zolpidem (AMBIEN) 10 mg Tab; Take 1 tablet (10 mg total) by mouth nightly as needed. -- Request refills from her psychiatrist.  Dispense: 30 tablet; Refill: 0    Screening for colorectal cancer  -     Ambulatory referral to Gastroenterology        Medication List with Changes/Refills   Current Medications    BACLOFEN (LIORESAL) 20 MG TABLET    Take 20 mg by mouth 3 (three) times daily.    DOCUSATE CALCIUM (STOOL SOFTENER ORAL)    Take 1 capsule by mouth 2 (two) times daily.     DOXEPIN (SINEQUAN) 50 MG CAPSULE    Take 1 capsule by mouth nightly.    FLUOXETINE (PROZAC) 10 MG CAPSULE    Take 1 capsule by mouth once daily.    GABAPENTIN (NEURONTIN) 600 MG TABLET    Take 600 mg by mouth 3 (three) times daily.    IBUPROFEN (ADVIL,MOTRIN) 200 MG TABLET    Take 200 mg by mouth as needed for Pain.    NICOTINE, POLACRILEX, (NICORETTE) 2 MG GUM    Take 1 each (2 mg total) by mouth as needed (use no more than 5 pieces in 24 hours).    NUCYNTA  MG TB12    Take 1 tablet by mouth every evening.    OLANZAPINE (ZYPREXA) 5 MG TABLET    Take 1 tablet by mouth nightly.    OXYCODONE-ACETAMINOPHEN (PERCOCET)  MG PER TABLET    Take 1 tablet by mouth 3 (three) times daily.    PHENYTOIN (DILANTIN) 50 MG CHEWABLE TABLET    Take 50 mg by mouth 3 (three) times daily.    POLYETHYLENE GLYCOL (GLYCOLAX) 17 GRAM/DOSE POWDER    Take 17 g by mouth once daily.    TRAMADOL (ULTRAM) 50 MG TABLET    Take 1 tablet by mouth 3 (three) times daily as needed.   Changed and/or Refilled Medications    Modified Medication Previous Medication    MIDODRINE (PROAMATINE) 2.5 MG TAB midodrine (PROAMATINE) 2.5 MG Tab       Take 1 tablet (2.5 mg total) by mouth 3 (three) times daily with  meals. -- refills to come from cardiologist    Take 1 tablet by mouth 2 (two) times daily.    ZOLPIDEM (AMBIEN) 10 MG TAB zolpidem (AMBIEN) 10 mg Tab       Take 1 tablet (10 mg total) by mouth nightly as needed. -- Request refills from her psychiatrist.    Take 10 mg by mouth nightly as needed.    Discontinued Medications    SODIUM,POTASSIUM,MAG SULFATES (SUPREP BOWEL PREP KIT) 17.5-3.13-1.6 GRAM SOLR    Use as directed       Return if symptoms worsen or fail to improve.    TOTAL TIME evaluating and managing this patient for this encounter exceeded 40 minutes, the majority spent counseling and coordinating care for the listed diagnoses.     ABOUT THIS DOCUMENTATION:  · The order of the conditions listed in the HPI is one of convenience and does not necessarily reflect the chronology of the appointment, nor the relative importance of a condition. It is possible that additional description or status details about condition(s) may be found elsewhere in the documentation for today's encounter.  · Documentation entered by me for this encounter was done in part using speech-recognition technology. Although I have made an effort to ensure accuracy, malapropisms may exist and should be interpreted in context.                        -JAIRO Dennis MD    There are no Patient Instructions on file for this visit.

## 2017-08-30 ENCOUNTER — TELEPHONE (OUTPATIENT)
Dept: GASTROENTEROLOGY | Facility: CLINIC | Age: 52
End: 2017-08-30

## 2017-08-30 NOTE — TELEPHONE ENCOUNTER
----- Message from Morena Arshad sent at 8/30/2017  7:16 AM CDT -----  Contact: Pt  Pt is returning a missed call.  Please advise [phone] or [phone]

## 2017-09-05 ENCOUNTER — CLINICAL SUPPORT (OUTPATIENT)
Dept: CARDIOLOGY | Facility: CLINIC | Age: 52
End: 2017-09-05
Payer: MEDICARE

## 2017-09-05 DIAGNOSIS — I95.9 HYPOTENSION, UNSPECIFIED HYPOTENSION TYPE: Primary | ICD-10-CM

## 2017-09-05 DIAGNOSIS — I95.9 HYPOTENSION, UNSPECIFIED HYPOTENSION TYPE: ICD-10-CM

## 2017-09-18 ENCOUNTER — TELEPHONE (OUTPATIENT)
Dept: CARDIOLOGY | Facility: CLINIC | Age: 52
End: 2017-09-18

## 2017-09-18 NOTE — TELEPHONE ENCOUNTER
----- Message from Cleo Arshad sent at 9/18/2017 11:21 AM CDT -----  Contact: pt  The pt request a call concerning her appt on 9/20, pt can be reached at [phone]///thxMW

## 2017-09-26 ENCOUNTER — TELEPHONE (OUTPATIENT)
Dept: SMOKING CESSATION | Facility: CLINIC | Age: 52
End: 2017-09-26

## 2017-09-26 NOTE — TELEPHONE ENCOUNTER
Attempt to contact patient for a smoking cessation progress update. Verbal message left with medical aide.

## 2017-10-11 ENCOUNTER — TELEPHONE (OUTPATIENT)
Dept: SMOKING CESSATION | Facility: CLINIC | Age: 52
End: 2017-10-11

## 2017-10-11 NOTE — TELEPHONE ENCOUNTER
Attempt to contact patient for a smoking cessation progress update. Verbal message left with home health nurse. Return contact information provided.

## 2017-10-13 ENCOUNTER — OFFICE VISIT (OUTPATIENT)
Dept: CARDIOLOGY | Facility: CLINIC | Age: 52
End: 2017-10-13
Payer: MEDICARE

## 2017-10-13 VITALS
WEIGHT: 134 LBS | BODY MASS INDEX: 23.74 KG/M2 | HEIGHT: 63 IN | DIASTOLIC BLOOD PRESSURE: 56 MMHG | HEART RATE: 92 BPM | SYSTOLIC BLOOD PRESSURE: 76 MMHG

## 2017-10-13 DIAGNOSIS — I87.303 CHRONIC VENOUS HYPERTENSION INVOLVING BOTH SIDES: Chronic | ICD-10-CM

## 2017-10-13 DIAGNOSIS — I95.1 AUTONOMIC ORTHOSTATIC HYPOTENSION: Primary | Chronic | ICD-10-CM

## 2017-10-13 DIAGNOSIS — R55 SYNCOPE, UNSPECIFIED SYNCOPE TYPE: ICD-10-CM

## 2017-10-13 DIAGNOSIS — S14.103S: Chronic | ICD-10-CM

## 2017-10-13 DIAGNOSIS — F17.211 NICOTINE DEPENDENCE, CIGARETTES, IN REMISSION: Chronic | ICD-10-CM

## 2017-10-13 DIAGNOSIS — G47.01 INSOMNIA DUE TO MEDICAL CONDITION: Chronic | ICD-10-CM

## 2017-10-13 DIAGNOSIS — Z79.891 LONG TERM CURRENT USE OF OPIATE ANALGESIC: Chronic | ICD-10-CM

## 2017-10-13 PROCEDURE — 99204 OFFICE O/P NEW MOD 45 MIN: CPT | Mod: S$GLB,,, | Performed by: INTERNAL MEDICINE

## 2017-10-13 PROCEDURE — 99999 PR PBB SHADOW E&M-EST. PATIENT-LVL II: CPT | Mod: PBBFAC,,, | Performed by: INTERNAL MEDICINE

## 2017-10-13 RX ORDER — FLUTICASONE PROPIONATE 0.5 MG/G
CREAM TOPICAL
Refills: 2 | COMMUNITY
Start: 2017-08-29

## 2017-10-13 RX ORDER — DOXEPIN HYDROCHLORIDE 50 MG/G
CREAM TOPICAL
COMMUNITY
Start: 2017-10-12 | End: 2017-12-05

## 2017-10-13 RX ORDER — MIDODRINE HYDROCHLORIDE 2.5 MG/1
2.5 TABLET ORAL
Qty: 90 TABLET | Refills: 3 | Status: SHIPPED | OUTPATIENT
Start: 2017-10-13 | End: 2018-01-10 | Stop reason: SDUPTHER
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
## 2017-10-13 NOTE — LETTER
October 13, 2017      JAIRO Dennis MD  [address] [address]           [facility]  [address] [facility] [address] [facility] [address] [address]  Phone: [phone]  Fax: [phone]          Patient: Josey Levine   MR Number: [id number]   YOB: 1965   Date of Visit: 10/13/2017       Dear Dr. JAIRO Dennis:    Thank you for referring Josey Levine to me for evaluation. Attached you will find relevant portions of my assessment and plan of care.    If you have questions, please do not hesitate to call me. I look forward to following Josey Levine along with you.    Sincerely,    Sadie Muir MD    Enclosure  CC:  No Recipients    If you would like to receive this communication electronically, please contact [email or web address] or [phone] to request more information on [employer] Link access.    For providers and/or their staff who would like to refer a patient to [facility], please contact us through our one-stop-shop provider referral line, [facility], at [phone].    If you feel you have received this communication in error or would no longer like to receive these types of communications, please e-mail [email or web address]

## 2017-10-19 ENCOUNTER — CLINICAL SUPPORT (OUTPATIENT)
Dept: CARDIOLOGY | Facility: CLINIC | Age: 52
End: 2017-10-19
Payer: MEDICARE

## 2017-10-19 DIAGNOSIS — R55 SYNCOPE, UNSPECIFIED SYNCOPE TYPE: ICD-10-CM

## 2017-10-19 PROCEDURE — 93306 TTE W/DOPPLER COMPLETE: CPT | Mod: S$GLB,,, | Performed by: INTERNAL MEDICINE

## 2017-10-20 ENCOUNTER — TELEPHONE (OUTPATIENT)
Dept: CARDIOLOGY | Facility: CLINIC | Age: 52
End: 2017-10-20

## 2017-10-20 LAB
DIASTOLIC DYSFUNCTION: YES
ESTIMATED PA SYSTOLIC PRESSURE: 18.84
RETIRED EF AND QEF - SEE NOTES: 60 (ref 55–65)

## 2017-10-27 ENCOUNTER — TELEPHONE (OUTPATIENT)
Dept: SMOKING CESSATION | Facility: CLINIC | Age: 52
End: 2017-10-27

## 2017-10-30 ENCOUNTER — TELEPHONE (OUTPATIENT)
Dept: SMOKING CESSATION | Facility: CLINIC | Age: 52
End: 2017-10-30

## 2017-10-30 NOTE — TELEPHONE ENCOUNTER
Patient's care giver called to reschedule her appointment with the smoking cessation program. Incoming call transferred. Appointment scheduled.

## 2017-11-02 ENCOUNTER — CLINICAL SUPPORT (OUTPATIENT)
Dept: SMOKING CESSATION | Facility: CLINIC | Age: 52
End: 2017-11-02
Payer: COMMERCIAL

## 2017-11-02 DIAGNOSIS — F17.200 NICOTINE DEPENDENCE: Primary | ICD-10-CM

## 2017-11-02 PROCEDURE — 99404 PREV MED CNSL INDIV APPRX 60: CPT | Mod: S$GLB,,, | Performed by: GENERAL PRACTICE

## 2017-11-03 RX ORDER — VARENICLINE TARTRATE 0.5 (11)-1
KIT ORAL
Qty: 1 PACKAGE | Refills: 0 | Status: SHIPPED | OUTPATIENT
Start: 2017-11-03 | End: 2017-12-05 | Stop reason: ALTCHOICE

## 2017-11-09 ENCOUNTER — TELEPHONE (OUTPATIENT)
Dept: INTERNAL MEDICINE | Facility: CLINIC | Age: 52
End: 2017-11-09

## 2017-11-09 ENCOUNTER — PATIENT OUTREACH (OUTPATIENT)
Dept: ADMINISTRATIVE | Facility: HOSPITAL | Age: 52
End: 2017-11-09

## 2017-11-09 NOTE — PROGRESS NOTES
Attempted to contact patient regarding scheduling for mammogram.   No answer. Left a detailed message including call back number with family member.

## 2017-11-09 NOTE — TELEPHONE ENCOUNTER
----- Message from Radha Mccarty sent at 11/9/2017 11:36 AM CST -----  Contact: hernán shaw  Would like to consult with nurse regarding status of paperwork sent on 10/24/17 . Please call back at [phone] ext [phone].        Thanks,  Radha Mccarty

## 2017-11-15 ENCOUNTER — TELEPHONE (OUTPATIENT)
Dept: INTERNAL MEDICINE | Facility: CLINIC | Age: 52
End: 2017-11-15

## 2017-11-15 NOTE — TELEPHONE ENCOUNTER
----- Message from Shade Rodriguez sent at 11/14/2017  9:03 AM CST -----  Contact: Sanford Scales)  Caller is calling to check the status of some paper work that was sent over for the pt. Please give them a call at [phone] [phone]

## 2017-11-16 ENCOUNTER — CLINICAL SUPPORT (OUTPATIENT)
Dept: SMOKING CESSATION | Facility: CLINIC | Age: 52
End: 2017-11-16
Payer: COMMERCIAL

## 2017-11-16 DIAGNOSIS — F17.200 NICOTINE DEPENDENCE: Primary | ICD-10-CM

## 2017-11-16 PROCEDURE — 99407 BEHAV CHNG SMOKING > 10 MIN: CPT | Mod: S$GLB,,, | Performed by: GENERAL PRACTICE

## 2017-11-17 ENCOUNTER — TELEPHONE (OUTPATIENT)
Dept: INTERNAL MEDICINE | Facility: CLINIC | Age: 52
End: 2017-11-17

## 2017-11-17 NOTE — TELEPHONE ENCOUNTER
----- Message from Mike Larsen sent at 11/17/2017  9:29 AM CST -----  Contact: [employer]/ Azar Wilson is calling nurse staff regarding power wheel chair. Call back [phone] ext [phone] Azar. Thanks

## 2017-11-17 NOTE — TELEPHONE ENCOUNTER
Spoke with company, advised I recvd the paperwork, will have the doctor sign them and fax them back

## 2017-11-21 ENCOUNTER — CLINICAL SUPPORT (OUTPATIENT)
Dept: SMOKING CESSATION | Facility: CLINIC | Age: 52
End: 2017-11-21
Payer: COMMERCIAL

## 2017-11-21 ENCOUNTER — OFFICE VISIT (OUTPATIENT)
Dept: URGENT CARE | Facility: CLINIC | Age: 52
End: 2017-11-21
Payer: MEDICARE

## 2017-11-21 ENCOUNTER — OFFICE VISIT (OUTPATIENT)
Dept: INTERNAL MEDICINE | Facility: CLINIC | Age: 52
End: 2017-11-21
Payer: MEDICARE

## 2017-11-21 VITALS
HEIGHT: 63 IN | OXYGEN SATURATION: 96 % | WEIGHT: 129.75 LBS | DIASTOLIC BLOOD PRESSURE: 69 MMHG | SYSTOLIC BLOOD PRESSURE: 112 MMHG | BODY MASS INDEX: 22.99 KG/M2 | HEART RATE: 76 BPM

## 2017-11-21 VITALS
TEMPERATURE: 96 F | HEIGHT: 63 IN | DIASTOLIC BLOOD PRESSURE: 62 MMHG | SYSTOLIC BLOOD PRESSURE: 104 MMHG | WEIGHT: 129.63 LBS | BODY MASS INDEX: 22.97 KG/M2

## 2017-11-21 DIAGNOSIS — R39.89 URINARY PROBLEM: ICD-10-CM

## 2017-11-21 DIAGNOSIS — F17.200 NICOTINE DEPENDENCE: Primary | ICD-10-CM

## 2017-11-21 DIAGNOSIS — G47.01 INSOMNIA DUE TO MEDICAL CONDITION: Chronic | ICD-10-CM

## 2017-11-21 DIAGNOSIS — I87.303 CHRONIC VENOUS HYPERTENSION INVOLVING BOTH SIDES: Chronic | ICD-10-CM

## 2017-11-21 DIAGNOSIS — N76.0 BV (BACTERIAL VAGINOSIS): ICD-10-CM

## 2017-11-21 DIAGNOSIS — R30.0 DYSURIA: ICD-10-CM

## 2017-11-21 DIAGNOSIS — S14.103S: Chronic | ICD-10-CM

## 2017-11-21 DIAGNOSIS — Z00.00 ENCOUNTER FOR PREVENTIVE HEALTH EXAMINATION: Primary | ICD-10-CM

## 2017-11-21 DIAGNOSIS — N89.8 VAGINAL DISCHARGE: Primary | ICD-10-CM

## 2017-11-21 DIAGNOSIS — N31.9 NEUROGENIC BLADDER: Chronic | ICD-10-CM

## 2017-11-21 DIAGNOSIS — F43.12 POST-TRAUMATIC STRESS DISORDER, CHRONIC: Chronic | ICD-10-CM

## 2017-11-21 DIAGNOSIS — F17.211 NICOTINE DEPENDENCE, CIGARETTES, IN REMISSION: Chronic | ICD-10-CM

## 2017-11-21 DIAGNOSIS — B96.89 BV (BACTERIAL VAGINOSIS): ICD-10-CM

## 2017-11-21 DIAGNOSIS — I51.89 DIASTOLIC DYSFUNCTION: ICD-10-CM

## 2017-11-21 DIAGNOSIS — I95.1 AUTONOMIC ORTHOSTATIC HYPOTENSION: Chronic | ICD-10-CM

## 2017-11-21 DIAGNOSIS — Z79.891 LONG TERM CURRENT USE OF OPIATE ANALGESIC: Chronic | ICD-10-CM

## 2017-11-21 DIAGNOSIS — K59.09 CONSTIPATION, CHRONIC: Chronic | ICD-10-CM

## 2017-11-21 DIAGNOSIS — R25.2 SPASTICITY: ICD-10-CM

## 2017-11-21 PROBLEM — Z79.899 ENCOUNTER FOR LONG-TERM CURRENT USE OF HIGH RISK MEDICATION: Chronic | Status: RESOLVED | Noted: 2017-05-24 | Resolved: 2017-11-21

## 2017-11-21 PROBLEM — Z12.12 SCREENING FOR COLORECTAL CANCER: Chronic | Status: RESOLVED | Noted: 2017-07-26 | Resolved: 2017-11-21

## 2017-11-21 PROBLEM — Z12.39 SCREENING FOR BREAST CANCER: Status: RESOLVED | Noted: 2017-07-26 | Resolved: 2017-11-21

## 2017-11-21 PROBLEM — R55 SYNCOPE: Status: RESOLVED | Noted: 2017-10-13 | Resolved: 2017-11-21

## 2017-11-21 PROBLEM — R53.83 FATIGUE: Chronic | Status: RESOLVED | Noted: 2017-05-24 | Resolved: 2017-11-21

## 2017-11-21 PROBLEM — Z12.11 SCREENING FOR COLORECTAL CANCER: Chronic | Status: RESOLVED | Noted: 2017-07-26 | Resolved: 2017-11-21

## 2017-11-21 LAB
BACTERIA GENITAL QL WET PREP: NORMAL
BILIRUB SERPL-MCNC: NEGATIVE MG/DL
BLOOD URINE, POC: NEGATIVE
CLUE CELLS VAG QL WET PREP: NORMAL
COLOR, POC UA: YELLOW
FILAMENT FUNGI VAG WET PREP-#/AREA: NORMAL
GLUCOSE UR QL STRIP: NORMAL
KETONES UR QL STRIP: NEGATIVE
LEUKOCYTE ESTERASE URINE, POC: NEGATIVE
NITRITE, POC UA: NEGATIVE
PH, POC UA: 5
PROTEIN, POC: NEGATIVE
SPECIFIC GRAVITY, POC UA: 1.02
SPECIMEN SOURCE: NORMAL
T VAGINALIS GENITAL QL WET PREP: NORMAL
UROBILINOGEN, POC UA: NORMAL
WBC #/AREA VAG WET PREP: NORMAL
YEAST GENITAL QL WET PREP: NORMAL

## 2017-11-21 PROCEDURE — G0439 PPPS, SUBSEQ VISIT: HCPCS | Mod: S$GLB,,, | Performed by: NURSE PRACTITIONER

## 2017-11-21 PROCEDURE — 99214 OFFICE O/P EST MOD 30 MIN: CPT | Mod: S$GLB,,, | Performed by: NURSE PRACTITIONER

## 2017-11-21 PROCEDURE — 99999 PR PBB SHADOW E&M-EST. PATIENT-LVL V: CPT | Mod: PBBFAC,,, | Performed by: NURSE PRACTITIONER

## 2017-11-21 PROCEDURE — 87210 SMEAR WET MOUNT SALINE/INK: CPT | Mod: PO

## 2017-11-21 PROCEDURE — 87086 URINE CULTURE/COLONY COUNT: CPT

## 2017-11-21 PROCEDURE — 99402 PREV MED CNSL INDIV APPRX 30: CPT | Mod: S$GLB,,, | Performed by: GENERAL PRACTICE

## 2017-11-21 PROCEDURE — 87591 N.GONORRHOEAE DNA AMP PROB: CPT

## 2017-11-21 PROCEDURE — 99999 PR PBB SHADOW E&M-EST. PATIENT-LVL IV: CPT | Mod: PBBFAC,,, | Performed by: NURSE PRACTITIONER

## 2017-11-21 RX ORDER — METRONIDAZOLE 500 MG/1
500 TABLET ORAL EVERY 12 HOURS
Qty: 14 TABLET | Refills: 0 | Status: SHIPPED | OUTPATIENT
Start: 2017-11-21 | End: 2017-11-28

## 2017-11-21 NOTE — Clinical Note
Your patient was seen today for a HRA visit. Abnormalities have been identified during this visit that may require additional testing and follow up. I have included a copy of my visit note, please review the note and feel free to contact me with any questions.  Thank you for allowing me to participate in the care of your patients.  Rita Arshad NP

## 2017-11-21 NOTE — PROGRESS NOTES
"Subjective:       Patient ID: Josey Levine is a 52 y.o. female.    Chief Complaint: Urinary Tract Infection and Vaginal Discharge    HPI  Josey presents with complaints of dysuria and foul smelling vaginal discharge which has been gradually worsening over the last few days. She states the odor is fishy. There is not pain or itching. She reports frequent use of feminine hygiene products and scented body washes. She has not been sexually active for several months but does report an encounter with her ex .   /62 (BP Location: Right arm, Patient Position: Sitting, BP Method: Small (Manual))   Temp 96.4 °F (35.8 °C) (Tympanic)   Ht 5' 3" (1.6 m)   Wt 58.8 kg (129 lb 10.1 oz)   BMI 22.96 kg/m²     Review of Systems   Constitutional: Negative for chills, diaphoresis and fever.   HENT: Negative for congestion and sore throat.    Respiratory: Negative for cough, chest tightness and shortness of breath.    Cardiovascular: Negative for chest pain and palpitations.   Gastrointestinal: Negative for abdominal distention, abdominal pain, diarrhea, nausea and vomiting.   Genitourinary: Positive for dysuria, frequency and vaginal discharge. Negative for decreased urine volume, difficulty urinating, flank pain, genital sores, hematuria, pelvic pain, urgency, vaginal bleeding and vaginal pain.   Musculoskeletal: Negative for myalgias.   Skin: Negative for rash and wound.   Allergic/Immunologic: Negative for immunocompromised state.   Neurological: Negative for headaches.   Hematological: Does not bruise/bleed easily.   Psychiatric/Behavioral: Negative for behavioral problems and confusion.       Objective:      Physical Exam   Constitutional: She is oriented to person, place, and time. She appears well-developed and well-nourished. No distress.   HENT:   Head: Normocephalic and atraumatic.   Mouth/Throat: Uvula is midline.   Eyes: Conjunctivae and EOM are normal. Pupils are equal, round, and reactive to light. "   Neck: Neck supple.   Cardiovascular: Normal rate, regular rhythm and intact distal pulses.    No murmur heard.  Pulmonary/Chest: Breath sounds normal. No respiratory distress. She has no wheezes.   Abdominal: Soft. Bowel sounds are normal. There is no tenderness.   Genitourinary: Pelvic exam was performed with patient supine. There is no rash, tenderness, lesion or injury on the right labia. There is no rash, tenderness, lesion or injury on the left labia.   Genitourinary Comments: Moderate amount of white odorous discharge present. No cervical motion tenderness   Musculoskeletal: Normal range of motion. She exhibits no edema or deformity.   Lymphadenopathy:     She has no cervical adenopathy.   Neurological: She is alert and oriented to person, place, and time.   Skin: Skin is warm and dry. No rash noted. She is not diaphoretic.   Psychiatric: She has a normal mood and affect. Her behavior is normal.   Vitals reviewed.      Assessment:       1. Vaginal discharge    2. Dysuria    3. BV (bacterial vaginosis)        Plan:       Josey was seen today for urinary tract infection and vaginal discharge.    Diagnoses and all orders for this visit:    Vaginal discharge  -     C. trachomatis/N. gonorrhoeae by AMP DNA Urine  -     Cancel: Vaginal Screen Vagina; Future  -     Urine culture  -     metroNIDAZOLE (FLAGYL) 500 MG tablet; Take 1 tablet (500 mg total) by mouth every 12 (twelve) hours.  -     Vaginal Screen Vagina  -     Vaginal Screen Vagina; Future    Dysuria  -     C. trachomatis/N. gonorrhoeae by AMP DNA Urine  -     Cancel: Vaginal Screen Vagina; Future  -     Urine culture  -     metroNIDAZOLE (FLAGYL) 500 MG tablet; Take 1 tablet (500 mg total) by mouth every 12 (twelve) hours.  -     POCT URINE DIPSTICK WITHOUT MICROSCOPE  -     Vaginal Screen Vagina  -     Vaginal Screen Vagina; Future    BV (bacterial vaginosis)  -     metroNIDAZOLE (FLAGYL) 500 MG tablet; Take 1 tablet (500 mg total) by mouth every 12  (twelve) hours.  -     Vaginal Screen Vagina; Future       Do not drink alcohol during and for 3 days after finishing medication.  Refrain from sexual intercourse during treatment.     -    Use warm water and unscented non-soap cleanser to wash your vaginal area.  -     Take baths in plain warm water with unscented bath products. Avoid bubble baths  -     Do not use sprays or powders on your vagina  -     Do not douche  -     Use cotton under garments which are loosely fitting  -     Follow up with PCP or GYN if no improvement, new or worsening symptoms

## 2017-11-21 NOTE — PATIENT INSTRUCTIONS
Vaginal Infection: Bacterial Vaginosis  Both good and bad bacteria are present in a healthy vagina. Bacterial vaginosis (BV) occurs when these bacteria get out of balance. The numbers of good bacteria decrease. This allows the numbers of bad bacteria to increase and cause BV. In most cases, BV is not a serious problem.  Causes of bacterial vaginosis  The cause of BV is not clear. Douching may lead to it. Having sex with a new partner or more than 1 partner makes it more likely.  Symptoms of bacterial vaginosis  Symptoms of BV vary for each woman. Some women have few symptoms or none at all. If symptoms are present, they can include:  · Thin, milky white or gray or sometimes green discharge  · Unpleasant fishy odor  · Irritation, itching, and burning at opening of vagina which may indicate mixed vaginitis    · Burning or irritation with sex or urination which may indicate mixed vaginitis  Diagnosing bacterial vaginosis  Your healthcare provider will ask about your symptoms and health history. He or she will also do a pelvic exam. This is an exam of your vagina and cervix. A sample of vaginal fluid or discharge may be taken. This sample is checked for signs of BV.  Treating bacterial vaginosis  BV is often treated with antibiotics. They may be given in oral pill form or as a vaginal cream. To use these medicines:  · Be sure to take all of your medicine, even if your symptoms go away.  · If youre taking antibiotic pills, do not drink alcohol until youre finished with all of your medicine.  · If youre using vaginal cream, apply it as directed. Be aware that the cream may make condoms and diaphragms less effective.  · Call your healthcare provider if symptoms do not go away within 4 days of starting treatment. Also call if you have a reaction to the medicine.  Why treatment matters  Even if you have no symptoms or your symptoms are mild, BV should be treated. Untreated BV can lead to health problems such  as:  · Increased risk of  delivery if youre pregnant  · Increased risk of complications after surgery on the reproductive organs  · Possible increased risk of pelvic inflammatory disease (PID)   Date Last Reviewed: 3/1/2017  © 7601-7462 [employer]. [address] [place] [address], [place] [address]. All rights reserved. This information is not intended as a substitute for professional medical care. Always follow your healthcare professional's instructions.

## 2017-11-21 NOTE — PROGRESS NOTES
Individual Follow-Up Form    11/21/2017    Clinical Status of Patient: Outpatient    Length of Service: 30 minutes    Continuing Medication: yes  Chantix     Target Symptoms: Withdrawal and medication side effects. The following were  rated moderate (3) to severe (4) on TCRS:  · Moderate (3): restless, anxious  · Severe (4): none    Comments: Patient was seen today for a smoking cessation progress update. She continues to use Chantix starter pack with no adverse side effects noted. She states that she feels that she may have a UTI and is having a urinalysis done. She states that she feels it is not caused by the Chantix but by her other medications. We discussed possible side effects of Chantix. Will monitor closely. She states that she continues to smoke 1 cigarette in the evening after she eats. She states that she has noticed that the cigarette does not taste the same and she is unable to completely smoke the entire cigarette. We discussed have a quit challenge. She states that she only has 1 remaining cigarette left in her pack and she will not purchase any more. Her caregiver is also a smoker and is interested in joining the program. Information provided. Encouraged her to make a quit attempt and reviewed high risk situations and triggers. She denies any negative thoughts or behavior at this time. Will continue to encourage and monitor progress.    Diagnosis: F17.200    Next Visit: 2 weeks
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
## 2017-11-21 NOTE — PATIENT INSTRUCTIONS
Counseling and Referral of Other Preventative  (Italic type indicates deductible and co-insurance are waived)    Patient Name: Josey Levine  Today's Date: 11/21/2017      SERVICE LIMITATIONS RECOMMENDATION    Vaccines    · Pneumococcal (once after 65)    · Influenza (annually)    · Hepatitis B (if medium/high risk)    · Prevnar 13      Hepatitis B medium/high risk factors:       - End-stage renal disease       - Hemophiliacs who received Factor VII or         IX concentrates       - Clients of institutions for the mentally             retarded       - Persons who live in the same house as          a HepB carrier       - Homosexual men       - Illicit injectable drug abusers     Pneumococcal: Recommended to patient, declined     Influenza: Recommended to patient, declined     Hepatitis B: N/A     Prevnar 13: Recommended to patient, declined    Mammogram (biennial age 50-74)  Annually (age 40 or over)  Scheduled, see appointments    Pap (up to age 70 and after 70 if unknown history or abnormal study last 10 years)       Pt request to wait    Colorectal cancer screening (to age 75)    · Fecal occult blood test (annual)  · Flexible sigmoidoscopy (5y)  · Screening colonoscopy (10y)  · Barium enema   Recommended to patient, declined    Diabetes self-management training (no USPSTF recommendations)  Requires referral by treating physician for patient with diabetes or renal disease. 10 hours of initial DSMT sessions of no less than 30 minutes each in a continuous 12-month period. 2 hours of follow-up DSMT in subsequent years.  N/A    Bone mass measurements (age 65 & older, biennial)  Requires diagnosis related to osteoporosis or estrogen deficiency. Biennial benefit unless patient has history of long-term glucocorticoid  N/A    Glaucoma screening (no USPSTF recommendation)  Diabetes mellitus, family history   , age 50 or over    American, age 65 or over  Recommended to patient, declined    Medical  nutrition therapy for diabetes or renal disease (no recommended schedule)  Requires referral by treating physician for patient with diabetes or renal disease or kidney transplant within the past 3 years.  Can be provided in same year as diabetes self-management training (DSMT), and CMS recommends medical nutrition therapy take place after DSMT. Up to 3 hours for initial year and 2 hours in subsequent years.  N/A    Cardiovascular screening blood tests (every 5 years)  · Fasting lipid panel  Order as a panel if possible  Recommended to patient, declined    Diabetes screening tests (at least every 3 years, Medicare covers annually or at 6-month intervals for prediabetic patients)  · Fasting blood sugar (FBS) or glucose tolerance test (GTT)  Patient must be diagnosed with one of the following:       - Hypertension       - Dyslipidemia       - Obesity (BMI 30kg/m2)       - Previous elevated impaired FBS or GTT       ... or any two of the following:       - Overweight (BMI 25 but <30)       - Family history of diabetes       - Age 65 or older       - History of gestational diabetes or birth of baby weighing more than 9 pounds  Recommended to patient, declined    HIV screening (annually for increased risk patients)  · HIV-1 and HIV-2 by EIA, or BASSAM, rapid antibody test or oral mucosa transudate  Patients must be at increased risk for HIV infection per USPSTF guidelines or pregnant. Tests covered annually for patient at increased risk or as requested by the patient. Pregnant patients may receive up to 3 tests during pregnancy.  Risks discussed, screening is not recommended    Smoking cessation counseling (up to 8 sessions per year)  Patients must be asymptomatic of tobacco-related conditions to receive as a preventative service.  Referred to Tobacco Cessation Program.    Subsequent annual wellness visit  At least 12 months since last AWV  Return in one year     The following information is provided to all patients.  This  information is to help you find resources for any of the problems found today that may be affecting your health:                Living healthy guide: www.[facility].[place].[facility]      Understanding Diabetes: www.diabetes.org      Eating healthy: www.cdc.gov/healthyweight      CDC home safety checklist: www.cdc.gov/steadi/patient.html      Agency on Aging: www.goea.[place].[facility]      Alcoholics anonymous (AA): www.aa.org      Physical Activity: www.chuy.nih.gov/ww4ziou      Tobacco use: www.quitwithusla.org

## 2017-11-22 ENCOUNTER — TELEPHONE (OUTPATIENT)
Dept: INTERNAL MEDICINE | Facility: CLINIC | Age: 52
End: 2017-11-22

## 2017-11-22 NOTE — TELEPHONE ENCOUNTER
----- Message from Cleo Arshad sent at 11/22/2017  8:47 AM CST -----  Contact: Holger Wilson  Request a call concerning paperwork on this pt, no additional info given can be reached at [phone] ext. [phone]///[phone]

## 2017-11-23 LAB
C TRACH DNA SPEC QL NAA+PROBE: NOT DETECTED
N GONORRHOEA DNA SPEC QL NAA+PROBE: NOT DETECTED

## 2017-11-24 LAB — BACTERIA UR CULT: NO GROWTH

## 2017-11-29 ENCOUNTER — TELEPHONE (OUTPATIENT)
Dept: INTERNAL MEDICINE | Facility: CLINIC | Age: 52
End: 2017-11-29

## 2017-11-29 NOTE — TELEPHONE ENCOUNTER
----- Message from Radha Mccarty sent at 11/29/2017  1:24 PM CST -----  Contact: donaldo-dawson shaw  Would like to speak with nurse regarding paperwork sent over for patient for power wheelchair. Information was sent on October 5th. Please call back at [phone] ext [phone].        Thanks,  Radha Mccarty

## 2017-11-29 NOTE — TELEPHONE ENCOUNTER
Left message for company that doctor is out for family emergency, will have one of my other doctors sign the form

## 2017-12-01 ENCOUNTER — TELEPHONE (OUTPATIENT)
Dept: INTERNAL MEDICINE | Facility: CLINIC | Age: 52
End: 2017-12-01

## 2017-12-01 NOTE — TELEPHONE ENCOUNTER
----- Message from Mike Larsen sent at 11/30/2017  3:15 PM CST -----  Contact: pt  Pt is calling nurse staff regarding pt is in need for a mobile wheelchair and is requesting the Doctor to sign off on it order or send an order. Pt call back [phone]. Pt stated that pt has been trying to get the wheel chair since October.  [employer] [phone]  Pt call back [phone] thanks

## 2017-12-05 ENCOUNTER — CLINICAL SUPPORT (OUTPATIENT)
Dept: SMOKING CESSATION | Facility: CLINIC | Age: 52
End: 2017-12-05
Payer: COMMERCIAL

## 2017-12-05 ENCOUNTER — OFFICE VISIT (OUTPATIENT)
Dept: INTERNAL MEDICINE | Facility: CLINIC | Age: 52
End: 2017-12-05
Payer: MEDICARE

## 2017-12-05 VITALS
DIASTOLIC BLOOD PRESSURE: 66 MMHG | BODY MASS INDEX: 22.93 KG/M2 | SYSTOLIC BLOOD PRESSURE: 104 MMHG | TEMPERATURE: 97 F | WEIGHT: 129.44 LBS | HEIGHT: 63 IN

## 2017-12-05 DIAGNOSIS — S14.103S: Chronic | ICD-10-CM

## 2017-12-05 DIAGNOSIS — Z02.9 ADMINISTRATIVE ENCOUNTER: ICD-10-CM

## 2017-12-05 DIAGNOSIS — F17.200 NICOTINE DEPENDENCE: Primary | ICD-10-CM

## 2017-12-05 DIAGNOSIS — G82.50 QUADRIPLEGIA: Primary | ICD-10-CM

## 2017-12-05 DIAGNOSIS — R25.2 SPASTICITY: ICD-10-CM

## 2017-12-05 DIAGNOSIS — K59.09 CONSTIPATION, CHRONIC: Chronic | ICD-10-CM

## 2017-12-05 DIAGNOSIS — I95.1 AUTONOMIC ORTHOSTATIC HYPOTENSION: Chronic | ICD-10-CM

## 2017-12-05 PROCEDURE — 99999 PR PBB SHADOW E&M-EST. PATIENT-LVL III: CPT | Mod: PBBFAC,,, | Performed by: FAMILY MEDICINE

## 2017-12-05 PROCEDURE — 99404 PREV MED CNSL INDIV APPRX 60: CPT | Mod: S$GLB,,, | Performed by: GENERAL PRACTICE

## 2017-12-05 PROCEDURE — 99214 OFFICE O/P EST MOD 30 MIN: CPT | Mod: S$GLB,,, | Performed by: FAMILY MEDICINE

## 2017-12-05 RX ORDER — FLUOXETINE 10 MG/1
1 CAPSULE ORAL
COMMUNITY
Start: 2017-07-13 | End: 2017-12-05

## 2017-12-05 RX ORDER — VARENICLINE TARTRATE 1 MG/1
1 TABLET, FILM COATED ORAL 2 TIMES DAILY
Qty: 60 TABLET | Refills: 1 | Status: SHIPPED | OUTPATIENT
Start: 2017-12-05 | End: 2018-07-25 | Stop reason: ALTCHOICE

## 2017-12-05 NOTE — PROGRESS NOTES
Individual Follow-Up Form    12/5/2017    Quit Date: 11-    Clinical Status of Patient: Outpatient    Length of Service: 60 minutes    Continuing Medication: yes  Chantix    Target Symptoms: Withdrawal and medication side effects. The following were  rated moderate (3) to severe (4) on TCRS:  · Moderate (3): increased appetite  · Severe (4): none    Comments: Patient was seen today for a smoking cessation progress update. She has successfully reached her goal and is tobacco free. She states that she left the last appointment and was determined to make a quit attempt as discussed. She continues to use Chantix without adverse side effects noted. She states that since she has stopped smoking she has gotten her appetite back. She denies any negative thoughts or behavior at this time. She is accompanied by her caregiver, Elizabeth Corado, who has also joined the program. Josey states that if she is around non-smokers then it will give her the best chance at staying tobacco free. We discussed triggers and coping strategies. She states understanding. Will continue to encourage and monitor progress.    Diagnosis: F17.200    Next Visit: 2 weeks

## 2017-12-18 ENCOUNTER — CLINICAL SUPPORT (OUTPATIENT)
Dept: SMOKING CESSATION | Facility: CLINIC | Age: 52
End: 2017-12-18
Payer: COMMERCIAL

## 2017-12-18 DIAGNOSIS — F17.200 NICOTINE DEPENDENCE: Primary | ICD-10-CM

## 2017-12-18 PROCEDURE — 99407 BEHAV CHNG SMOKING > 10 MIN: CPT | Mod: S$GLB,,, | Performed by: GENERAL PRACTICE

## 2017-12-22 PROBLEM — G82.50 QUADRIPLEGIA: Status: ACTIVE | Noted: 2017-12-22

## 2017-12-22 PROBLEM — G82.50 QUADRIPLEGIA: Chronic | Status: ACTIVE | Noted: 2017-12-22

## 2017-12-22 RX ORDER — MELOXICAM 15 MG/1
1 TABLET ORAL DAILY
Refills: 0 | COMMUNITY
Start: 2017-12-21

## 2017-12-22 NOTE — PROGRESS NOTES
Subjective:   Patient ID: Josey Levine is a 52 y.o. female.  Chief Complaint:  Establish Care      Patient seen November 2017 by Dr. Dennis for full physical exam.  Needs form/additional evaluation for power wheelchair/mobility assistive device.  Patient has completed functional mobility evaluation.  Reviewed results.  Agree with findings.  Already has letter of support for Dr. Dennis for power wheelchair.   Conditions causing deficits ane need for power wheelchair  quadriplegia unspecified,  Injury at C3 level of cervical spinal cord, spasticity, and autonomic ORTHOSTATIC hypotension.  Area and  Patient reports today that chronic pain is stable on present regimen from pain management Dr. Dong.  Psychiatry/insomnia stable on Prozac, Zyprexa, and Ambien.  Blood pressure controlled.  Dependent edema stable with graduated compression stockings.  Increased GI upset and constipation despite present medication use.  Has not tried MiraLAX.        Current Outpatient Prescriptions:     baclofen (LIORESAL) 20 MG tablet, Take 20 mg by mouth 3 (three) times daily., Disp: , Rfl:     DOCUSATE CALCIUM (STOOL SOFTENER ORAL), Take 1 capsule by mouth 2 (two) times daily. , Disp: , Rfl:     gabapentin (NEURONTIN) 600 MG tablet, Take 600 mg by mouth 3 (three) times daily., Disp: , Rfl:     midodrine (PROAMATINE) 2.5 MG Tab, Take 1 tablet (2.5 mg total) by mouth 3 (three) times daily with meals. -- refills to come from cardiologist, Disp: 90 tablet, Rfl: 3    oxycodone-acetaminophen (PERCOCET)  mg per tablet, Take 1 tablet by mouth 3 (three) times daily., Disp: , Rfl: 0    phenytoin (DILANTIN) 50 mg chewable tablet, Take 50 mg by mouth 3 (three) times daily., Disp: , Rfl:     polyethylene glycol (GLYCOLAX) 17 gram/dose powder, Take 17 g by mouth once daily., Disp: 510 g, Rfl: 5    tramadol (ULTRAM) 50 mg tablet, Take 1 tablet by mouth 3 (three) times daily as needed., Disp: , Rfl: 0    fluticasone (CUTIVATE)  "0.05 % cream, KHANG ON THE SKIN BID FOR 1 TO 2 WKS AT A TIME, Disp: , Rfl: 2    meloxicam (MOBIC) 15 MG tablet, Take 1 tablet by mouth once daily., Disp: , Rfl: 0    NUCYNTA  mg Tb12, Take 1 tablet by mouth every evening., Disp: , Rfl: 0    varenicline (CHANTIX) 1 mg Tab, Take 1 tablet (1 mg total) by mouth 2 (two) times daily., Disp: 60 tablet, Rfl: 1     Review of Systems   Constitutional: Positive for fatigue. Negative for chills and fever.   HENT: Negative for congestion, dental problem, ear pain, postnasal drip, sinus pressure and sore throat.    Eyes: Negative for visual disturbance.   Respiratory: Negative for cough, chest tightness, shortness of breath and wheezing.    Cardiovascular: Negative for chest pain, palpitations and leg swelling.   Gastrointestinal: Positive for abdominal pain and constipation. Negative for blood in stool, diarrhea, nausea and vomiting.   Endocrine: Negative for polydipsia, polyphagia and polyuria.   Genitourinary: Negative for difficulty urinating, dysuria, flank pain, hematuria and pelvic pain.   Musculoskeletal: Positive for arthralgias, back pain, gait problem, joint swelling, myalgias, neck pain and neck stiffness.   Skin: Negative for rash.   Neurological: Positive for dizziness, syncope, weakness and light-headedness. Negative for tremors, seizures, facial asymmetry, speech difficulty, numbness and headaches.   Hematological: Negative for adenopathy. Bruises/bleeds easily.   Psychiatric/Behavioral: Negative.  Negative for agitation, behavioral problems, confusion, decreased concentration and sleep disturbance. The patient is not nervous/anxious.      Objective:   /66 (BP Location: Right arm, Patient Position: Sitting, BP Method: Small (Manual))   Temp 96.6 °F (35.9 °C) (Tympanic)   Ht 5' 3" (1.6 m)   Wt 58.7 kg (129 lb 6.6 oz)   BMI 22.92 kg/m²     Physical Exam   Constitutional: She appears well-developed.   HENT:   Head: Normocephalic and atraumatic. "   Eyes: Pupils are equal, round, and reactive to light.   Neck: Carotid bruit is not present.   Cardiovascular: Normal rate, regular rhythm, normal heart sounds, intact distal pulses and normal pulses.  Exam reveals no gallop.    No murmur heard.  Pulmonary/Chest: Effort normal and breath sounds normal.   Abdominal: Soft. Normal appearance and bowel sounds are normal. She exhibits no distension. There is no tenderness.   Musculoskeletal: She exhibits no edema or tenderness.   Generalized weakness -worse on Rt side  Limited joint mobility x all extremities   Neurological: She is alert. Gait abnormal.   See detailed abnormal findings in functional mobility evaluation form.   Skin: Skin is warm, dry and intact.   Psychiatric: She has a normal mood and affect. Her speech is normal and behavior is normal. Judgment and thought content normal. Cognition and memory are normal.   Nursing note and vitals reviewed.    Assessment:     1. Quadriplegia    2. Unspecified injury at c3 level of cervical spinal cord, sequela    3. Spasticity    4. Autonomic orthostatic hypotension    5. Administrative encounter    6. Constipation, chronic      Plan:   Quadriplegia  Unspecified injury at c3 level of cervical spinal cord, sequela  Spasticity  Autonomic orthostatic hypotension  Administrative encounter  Agree with findings in functional mobility evaluation and need for power wheelchair to help with mobility assistance.  Signed order with mobility evaluation and office notes will be faxed to [facility].    Constipation, chronic  Trial MiraLAX daily as needed for constipation.    Follow-up with all specialists as planned for psychiatry and pain management.  Return to clinic 6 months or sooner as needed.

## 2018-01-10 DIAGNOSIS — R55 SYNCOPE, UNSPECIFIED SYNCOPE TYPE: ICD-10-CM

## 2018-01-10 DIAGNOSIS — I95.1 AUTONOMIC ORTHOSTATIC HYPOTENSION: Chronic | ICD-10-CM

## 2018-01-10 RX ORDER — MIDODRINE HYDROCHLORIDE 2.5 MG/1
2.5 TABLET ORAL
Qty: 90 TABLET | Refills: 3 | Status: SHIPPED | OUTPATIENT
Start: 2018-01-10

## 2018-01-11 ENCOUNTER — TELEPHONE (OUTPATIENT)
Dept: SMOKING CESSATION | Facility: CLINIC | Age: 53
End: 2018-01-11

## 2018-01-26 ENCOUNTER — OFFICE VISIT (OUTPATIENT)
Dept: PAIN MEDICINE | Facility: CLINIC | Age: 53
End: 2018-01-26
Payer: MEDICARE

## 2018-01-26 VITALS
WEIGHT: 130 LBS | RESPIRATION RATE: 16 BRPM | BODY MASS INDEX: 23.04 KG/M2 | SYSTOLIC BLOOD PRESSURE: 129 MMHG | HEART RATE: 72 BPM | HEIGHT: 63 IN | DIASTOLIC BLOOD PRESSURE: 95 MMHG

## 2018-01-26 DIAGNOSIS — G89.4 CHRONIC PAIN DISORDER: ICD-10-CM

## 2018-01-26 DIAGNOSIS — S14.103S: ICD-10-CM

## 2018-01-26 DIAGNOSIS — M62.81 MUSCLE WEAKNESS: ICD-10-CM

## 2018-01-26 DIAGNOSIS — R25.2 SPASTICITY: ICD-10-CM

## 2018-01-26 DIAGNOSIS — Z79.891 LONG TERM CURRENT USE OF OPIATE ANALGESIC: ICD-10-CM

## 2018-01-26 DIAGNOSIS — G82.50 QUADRIPLEGIA: Primary | ICD-10-CM

## 2018-01-26 PROCEDURE — 99999 PR PBB SHADOW E&M-EST. PATIENT-LVL IV: CPT | Mod: PBBFAC,,, | Performed by: PHYSICIAN ASSISTANT

## 2018-01-26 PROCEDURE — 99204 OFFICE O/P NEW MOD 45 MIN: CPT | Mod: S$GLB,,, | Performed by: PHYSICIAN ASSISTANT

## 2018-01-26 NOTE — LETTER
January 26, 2018      JAIRO Dennis MD  [address] [facility] [address]           [facility]  [id number] [place] [id number]  Phone: [phone]  Fax: [phone]          Patient: Josey Levine   MR Number: [id number]   YOB: 1965   Date of Visit: 1/26/2018       Dear Dr. JAIRO Dennis:    Thank you for referring Josey Levine to me for evaluation. Attached you will find relevant portions of my assessment and plan of care.    If you have questions, please do not hesitate to call me. I look forward to following Josey Levine along with you.    Sincerely,    Marta Dow PA-C    Enclosure  CC:  No Recipients    If you would like to receive this communication electronically, please contact [email or web address] or [phone] to request more information on [employer] access.    For providers and/or their staff who would like to refer a patient to [facility], please contact us through our one-stop-shop provider referral line, [facility], at [phone].    If you feel you have received this communication in error or would no longer like to receive these types of communications, please e-mail [email or web address]
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
## 2018-02-26 ENCOUNTER — TELEPHONE (OUTPATIENT)
Dept: SMOKING CESSATION | Facility: CLINIC | Age: 53
End: 2018-02-26

## 2018-03-14 ENCOUNTER — TELEPHONE (OUTPATIENT)
Dept: FAMILY MEDICINE | Facility: CLINIC | Age: 53
End: 2018-03-14

## 2018-03-14 NOTE — TELEPHONE ENCOUNTER
----- Message from Mary Anne Rayo sent at 3/14/2018  2:26 PM CDT -----  called rg status of knee brace order sent last wk, needs before 3/23...[phone] (home)

## 2018-03-15 NOTE — TELEPHONE ENCOUNTER
Spoke to [facility], and they will fax a new order. Their phone number is [phone].

## 2018-03-15 NOTE — TELEPHONE ENCOUNTER
Spoke to patient. I advised I will call [facility] to have them refax her order for a knee brace.

## 2018-04-18 ENCOUNTER — TELEPHONE (OUTPATIENT)
Dept: PAIN MEDICINE | Facility: CLINIC | Age: 53
End: 2018-04-18

## 2018-04-18 NOTE — TELEPHONE ENCOUNTER
----- Message from Shade Rodriguez sent at 4/18/2018  1:32 PM CDT -----  Contact: JOSE G Tai (Michelle)  Please give Michelle a call at [phone] regarding some fax that were sent over weeks ago and no one has responded. Caller has requested the medical necessity form to be faxed to [phone]

## 2018-07-10 ENCOUNTER — CLINICAL SUPPORT (OUTPATIENT)
Dept: SMOKING CESSATION | Facility: CLINIC | Age: 53
End: 2018-07-10
Payer: COMMERCIAL

## 2018-07-10 DIAGNOSIS — F17.200 NICOTINE DEPENDENCE: Primary | ICD-10-CM

## 2018-07-10 PROCEDURE — 99407 BEHAV CHNG SMOKING > 10 MIN: CPT | Mod: S$GLB,,,

## 2018-07-10 NOTE — PROGRESS NOTES
Spoke with patient today in regards to smoking cessation progress for 12 month follow up,  states not tobacco free at this time. Patient has scheduled an appointment for Quit #2. Informed patient of benefit period, future follow ups, and contact information. Will resolve episode and complete smart form for Quit attempt #1.

## 2018-07-19 ENCOUNTER — TELEPHONE (OUTPATIENT)
Dept: SMOKING CESSATION | Facility: CLINIC | Age: 53
End: 2018-07-19

## 2018-07-19 NOTE — TELEPHONE ENCOUNTER
Spoke with patient in regards to her missed appointment. She states that the transport staff was unable to acomodate her and her wheelchair today and she was unable to make her appointment. She is interested in rescheduling.

## 2018-07-25 ENCOUNTER — CLINICAL SUPPORT (OUTPATIENT)
Dept: SMOKING CESSATION | Facility: CLINIC | Age: 53
End: 2018-07-25
Payer: COMMERCIAL

## 2018-07-25 DIAGNOSIS — F17.200 NICOTINE DEPENDENCE: Primary | ICD-10-CM

## 2018-07-25 PROCEDURE — 99404 PREV MED CNSL INDIV APPRX 60: CPT | Mod: S$GLB,,, | Performed by: GENERAL PRACTICE

## 2018-07-25 RX ORDER — NICOTINE 7MG/24HR
1 PATCH, TRANSDERMAL 24 HOURS TRANSDERMAL DAILY
Qty: 28 PATCH | Refills: 1 | Status: SHIPPED | OUTPATIENT
Start: 2018-07-25 | End: 2018-08-09

## 2018-08-09 ENCOUNTER — CLINICAL SUPPORT (OUTPATIENT)
Dept: SMOKING CESSATION | Facility: CLINIC | Age: 53
End: 2018-08-09
Payer: COMMERCIAL

## 2018-08-09 DIAGNOSIS — F17.200 NICOTINE DEPENDENCE: Primary | ICD-10-CM

## 2018-08-09 PROCEDURE — 99402 PREV MED CNSL INDIV APPRX 30: CPT | Mod: S$GLB,,, | Performed by: GENERAL PRACTICE

## 2018-08-09 RX ORDER — IBUPROFEN 200 MG
1 TABLET ORAL DAILY
Qty: 14 PATCH | Refills: 1 | Status: SHIPPED | OUTPATIENT
Start: 2018-08-09

## 2018-08-09 NOTE — PROGRESS NOTES
Individual Follow-Up Form    8/9/2018    Clinical Status of Patient: Outpatient    Length of Service: 30 minutes    Continuing Medication: yes  Patches     Target Symptoms: Withdrawal and medication side effects. The following were  rated moderate (3) to severe (4) on TCRS:  · Moderate (3): desire tobacco, irritable, depressed  Severe (4): none   Comments: Patient was seen today for a smoking cessation progress update. She states that she continues to smoke despite wearing a 7 mg nicotine patch. She states that she is smoking 4 cigarettes daily and feels that she needs a stronger nicotine patch. She states that she has been depressed and is eager to move to [place] as soon as her son's house sells. She states that she is tired of [place] and feels that moving to a new location will help with her physical rehab progress. She states that she is eating more but not complaining. Reminded her of healthy eating habits. Discussed coping strategies and increasing her patch dose to 14 mg. Reviewed patch use and possible side effects. She verbalized understanding. Encouraged her to talk with her Psychiatrist in regards to her depression. She states that she will. No follow up appointment scheduled due to her waiting to find out about her move. She feels that it will be very soon but will call back to schedule her appointment. Will continue to encourage and monitor progress.    Diagnosis: F17.200    Next Visit: 2 weeks

## 2018-10-01 DIAGNOSIS — Z12.39 BREAST CANCER SCREENING: ICD-10-CM

## 2018-12-13 ENCOUNTER — TELEPHONE (OUTPATIENT)
Dept: SMOKING CESSATION | Facility: CLINIC | Age: 53
End: 2018-12-13

## 2019-01-01 NOTE — TELEPHONE ENCOUNTER
Faxed PT wheelchair referral per pt request to [facility] on [facility] with instructions for them to call pt for appt, P#[phone], F#[phone], fax transmission confirmed.  
01-Jan-2019 21:17

## 2019-01-03 ENCOUNTER — TELEPHONE (OUTPATIENT)
Dept: SMOKING CESSATION | Facility: CLINIC | Age: 54
End: 2019-01-03

## 2019-05-29 ENCOUNTER — TELEPHONE (OUTPATIENT)
Dept: ADMINISTRATIVE | Facility: HOSPITAL | Age: 54
End: 2019-05-29

## 2019-08-05 ENCOUNTER — TELEPHONE (OUTPATIENT)
Dept: SMOKING CESSATION | Facility: CLINIC | Age: 54
End: 2019-08-05

## 2019-08-19 ENCOUNTER — PATIENT OUTREACH (OUTPATIENT)
Dept: ADMINISTRATIVE | Facility: HOSPITAL | Age: 54
End: 2019-08-19

## 2019-09-20 ENCOUNTER — PES CALL (OUTPATIENT)
Dept: ADMINISTRATIVE | Facility: CLINIC | Age: 54
End: 2019-09-20

## 2019-11-20 ENCOUNTER — PATIENT OUTREACH (OUTPATIENT)
Dept: ADMINISTRATIVE | Facility: HOSPITAL | Age: 54
End: 2019-11-20